# Patient Record
Sex: MALE | Race: WHITE | NOT HISPANIC OR LATINO | Employment: OTHER | URBAN - METROPOLITAN AREA
[De-identification: names, ages, dates, MRNs, and addresses within clinical notes are randomized per-mention and may not be internally consistent; named-entity substitution may affect disease eponyms.]

---

## 2017-04-30 ENCOUNTER — HOSPITAL ENCOUNTER (EMERGENCY)
Facility: HOSPITAL | Age: 57
Discharge: HOME | End: 2017-04-30
Admitting: EMERGENCY MEDICINE
Payer: MEDICARE

## 2017-04-30 ENCOUNTER — APPOINTMENT (EMERGENCY)
Dept: CT IMAGING | Facility: HOSPITAL | Age: 57
End: 2017-04-30
Payer: MEDICARE

## 2017-04-30 VITALS
TEMPERATURE: 98.2 F | WEIGHT: 180 LBS | HEIGHT: 72 IN | BODY MASS INDEX: 24.38 KG/M2 | SYSTOLIC BLOOD PRESSURE: 134 MMHG | HEART RATE: 78 BPM | OXYGEN SATURATION: 95 % | DIASTOLIC BLOOD PRESSURE: 67 MMHG | RESPIRATION RATE: 16 BRPM

## 2017-04-30 DIAGNOSIS — K52.9 COLITIS, ACUTE: Primary | ICD-10-CM

## 2017-04-30 DIAGNOSIS — R10.13 EPIGASTRIC ABDOMINAL PAIN: ICD-10-CM

## 2017-04-30 LAB
ALBUMIN SERPL BCP-MCNC: 3.5 G/DL (ref 3.5–5)
ALP SERPL-CCNC: 82 U/L (ref 46–116)
ALT SERPL W P-5'-P-CCNC: 21 U/L (ref 12–78)
ANION GAP SERPL CALCULATED.3IONS-SCNC: 6 MMOL/L (ref 4–13)
AST SERPL W P-5'-P-CCNC: 15 U/L (ref 5–45)
BASOPHILS # BLD AUTO: 0.02 THOUSANDS/ΜL (ref 0–0.1)
BASOPHILS NFR BLD AUTO: 0 % (ref 0–1)
BILIRUB SERPL-MCNC: 0.6 MG/DL (ref 0.2–1)
BUN SERPL-MCNC: 18 MG/DL (ref 5–25)
CALCIUM SERPL-MCNC: 8.9 MG/DL (ref 8.3–10.1)
CHLORIDE SERPL-SCNC: 106 MMOL/L (ref 100–108)
CLARITY, POC: CLEAR
CO2 SERPL-SCNC: 29 MMOL/L (ref 21–32)
COLOR, POC: YELLOW
CREAT SERPL-MCNC: 0.87 MG/DL (ref 0.6–1.3)
EOSINOPHIL # BLD AUTO: 0.01 THOUSAND/ΜL (ref 0–0.61)
EOSINOPHIL NFR BLD AUTO: 0 % (ref 0–6)
ERYTHROCYTE [DISTWIDTH] IN BLOOD BY AUTOMATED COUNT: 13.5 % (ref 11.6–15.1)
EXT BILIRUBIN, UA: NORMAL
EXT BLOOD URINE: NORMAL
EXT GLUCOSE, UA: 100
EXT KETONES: 40
EXT NITRITE, UA: NORMAL
EXT PH, UA: 7
EXT PROTEIN, UA: NORMAL
EXT SPECIFIC GRAVITY, UA: 1.01
EXT UROBILINOGEN: 0.2
GFR SERPL CREATININE-BSD FRML MDRD: >60 ML/MIN/1.73SQ M
GLUCOSE SERPL-MCNC: 104 MG/DL (ref 65–140)
HCT VFR BLD AUTO: 42.5 % (ref 36.5–49.3)
HGB BLD-MCNC: 15.5 G/DL (ref 12–17)
LIPASE SERPL-CCNC: 65 U/L (ref 73–393)
LYMPHOCYTES # BLD AUTO: 1.07 THOUSANDS/ΜL (ref 0.6–4.47)
LYMPHOCYTES NFR BLD AUTO: 9 % (ref 14–44)
MCH RBC QN AUTO: 30.7 PG (ref 26.8–34.3)
MCHC RBC AUTO-ENTMCNC: 36.5 G/DL (ref 31.4–37.4)
MCV RBC AUTO: 84 FL (ref 82–98)
MONOCYTES # BLD AUTO: 0.96 THOUSAND/ΜL (ref 0.17–1.22)
MONOCYTES NFR BLD AUTO: 8 % (ref 4–12)
NEUTROPHILS # BLD AUTO: 10.07 THOUSANDS/ΜL (ref 1.85–7.62)
NEUTS SEG NFR BLD AUTO: 83 % (ref 43–75)
PLATELET # BLD AUTO: 201 THOUSANDS/UL (ref 149–390)
PMV BLD AUTO: 8.8 FL (ref 8.9–12.7)
POTASSIUM SERPL-SCNC: 3.7 MMOL/L (ref 3.5–5.3)
PROT SERPL-MCNC: 6.9 G/DL (ref 6.4–8.2)
RBC # BLD AUTO: 5.05 MILLION/UL (ref 3.88–5.62)
SODIUM SERPL-SCNC: 141 MMOL/L (ref 136–145)
WBC # BLD AUTO: 12.13 THOUSAND/UL (ref 4.31–10.16)
WBC # BLD EST: NORMAL 10*3/UL

## 2017-04-30 PROCEDURE — 99284 EMERGENCY DEPT VISIT MOD MDM: CPT

## 2017-04-30 PROCEDURE — 96374 THER/PROPH/DIAG INJ IV PUSH: CPT

## 2017-04-30 PROCEDURE — 80053 COMPREHEN METABOLIC PANEL: CPT | Performed by: PHYSICIAN ASSISTANT

## 2017-04-30 PROCEDURE — 36415 COLL VENOUS BLD VENIPUNCTURE: CPT | Performed by: PHYSICIAN ASSISTANT

## 2017-04-30 PROCEDURE — 96361 HYDRATE IV INFUSION ADD-ON: CPT

## 2017-04-30 PROCEDURE — 96375 TX/PRO/DX INJ NEW DRUG ADDON: CPT

## 2017-04-30 PROCEDURE — 83690 ASSAY OF LIPASE: CPT | Performed by: PHYSICIAN ASSISTANT

## 2017-04-30 PROCEDURE — 81002 URINALYSIS NONAUTO W/O SCOPE: CPT | Performed by: PHYSICIAN ASSISTANT

## 2017-04-30 PROCEDURE — 74177 CT ABD & PELVIS W/CONTRAST: CPT

## 2017-04-30 PROCEDURE — 93005 ELECTROCARDIOGRAM TRACING: CPT | Performed by: PHYSICIAN ASSISTANT

## 2017-04-30 PROCEDURE — 85025 COMPLETE CBC W/AUTO DIFF WBC: CPT | Performed by: PHYSICIAN ASSISTANT

## 2017-04-30 RX ORDER — DOCUSATE SODIUM 100 MG/1
100 CAPSULE, LIQUID FILLED ORAL 2 TIMES DAILY
COMMUNITY
End: 2021-08-19

## 2017-04-30 RX ORDER — METRONIDAZOLE 500 MG/1
500 TABLET ORAL 3 TIMES DAILY
Qty: 30 TABLET | Refills: 0 | Status: SHIPPED | OUTPATIENT
Start: 2017-04-30 | End: 2017-05-10

## 2017-04-30 RX ORDER — METOCLOPRAMIDE 10 MG/1
10 TABLET ORAL 3 TIMES DAILY PRN
COMMUNITY
End: 2019-10-25

## 2017-04-30 RX ORDER — METRONIDAZOLE 250 MG/1
500 TABLET ORAL ONCE
Status: COMPLETED | OUTPATIENT
Start: 2017-04-30 | End: 2017-04-30

## 2017-04-30 RX ORDER — ONDANSETRON 2 MG/ML
4 INJECTION INTRAMUSCULAR; INTRAVENOUS ONCE
Status: COMPLETED | OUTPATIENT
Start: 2017-04-30 | End: 2017-04-30

## 2017-04-30 RX ORDER — CIPROFLOXACIN 250 MG/1
500 TABLET, FILM COATED ORAL ONCE
Status: COMPLETED | OUTPATIENT
Start: 2017-04-30 | End: 2017-04-30

## 2017-04-30 RX ORDER — KETOROLAC TROMETHAMINE 30 MG/ML
30 INJECTION, SOLUTION INTRAMUSCULAR; INTRAVENOUS ONCE
Status: COMPLETED | OUTPATIENT
Start: 2017-04-30 | End: 2017-04-30

## 2017-04-30 RX ORDER — FOLIC ACID/MULTIVIT,IRON,MINER .4-18-35
1 TABLET,CHEWABLE ORAL DAILY
COMMUNITY

## 2017-04-30 RX ORDER — CIPROFLOXACIN 500 MG/1
500 TABLET, FILM COATED ORAL 2 TIMES DAILY
Qty: 20 TABLET | Refills: 0 | Status: SHIPPED | OUTPATIENT
Start: 2017-04-30 | End: 2017-05-10

## 2017-04-30 RX ORDER — ONDANSETRON 4 MG/1
4 TABLET, ORALLY DISINTEGRATING ORAL EVERY 8 HOURS PRN
Qty: 15 TABLET | Refills: 0 | Status: SHIPPED | OUTPATIENT
Start: 2017-04-30 | End: 2018-01-21 | Stop reason: ALTCHOICE

## 2017-04-30 RX ADMIN — ONDANSETRON 4 MG: 2 INJECTION INTRAMUSCULAR; INTRAVENOUS at 12:23

## 2017-04-30 RX ADMIN — METRONIDAZOLE 500 MG: 250 TABLET ORAL at 14:13

## 2017-04-30 RX ADMIN — KETOROLAC TROMETHAMINE 30 MG: 30 INJECTION, SOLUTION INTRAMUSCULAR at 12:20

## 2017-04-30 RX ADMIN — CIPROFLOXACIN HYDROCHLORIDE 500 MG: 250 TABLET, FILM COATED ORAL at 14:13

## 2017-04-30 RX ADMIN — IOHEXOL 100 ML: 350 INJECTION, SOLUTION INTRAVENOUS at 13:25

## 2017-04-30 RX ADMIN — SODIUM CHLORIDE 1000 ML: 0.9 INJECTION, SOLUTION INTRAVENOUS at 12:20

## 2017-05-01 LAB
ATRIAL RATE: 74 BPM
P AXIS: 66 DEGREES
PR INTERVAL: 132 MS
QRS AXIS: 82 DEGREES
QRSD INTERVAL: 92 MS
QT INTERVAL: 372 MS
QTC INTERVAL: 412 MS
T WAVE AXIS: 59 DEGREES
VENTRICULAR RATE: 74 BPM

## 2017-05-03 ENCOUNTER — ANESTHESIA EVENT (OUTPATIENT)
Dept: PERIOP | Facility: HOSPITAL | Age: 57
End: 2017-05-03
Payer: MEDICARE

## 2017-05-03 ENCOUNTER — HOSPITAL ENCOUNTER (OUTPATIENT)
Facility: HOSPITAL | Age: 57
Setting detail: OUTPATIENT SURGERY
Discharge: HOME | End: 2017-05-03
Attending: INTERNAL MEDICINE | Admitting: INTERNAL MEDICINE
Payer: MEDICARE

## 2017-05-03 ENCOUNTER — ANESTHESIA (OUTPATIENT)
Dept: PERIOP | Facility: HOSPITAL | Age: 57
End: 2017-05-03
Payer: MEDICARE

## 2017-05-03 VITALS
TEMPERATURE: 98.7 F | BODY MASS INDEX: 25.18 KG/M2 | WEIGHT: 170 LBS | OXYGEN SATURATION: 99 % | DIASTOLIC BLOOD PRESSURE: 63 MMHG | SYSTOLIC BLOOD PRESSURE: 105 MMHG | HEART RATE: 82 BPM | RESPIRATION RATE: 16 BRPM | HEIGHT: 69 IN

## 2017-05-03 PROCEDURE — 87077 CULTURE AEROBIC IDENTIFY: CPT | Performed by: INTERNAL MEDICINE

## 2017-05-03 RX ORDER — SODIUM CHLORIDE 9 MG/ML
20 INJECTION, SOLUTION INTRAVENOUS CONTINUOUS
Status: DISCONTINUED | OUTPATIENT
Start: 2017-05-03 | End: 2017-05-03 | Stop reason: HOSPADM

## 2017-05-03 RX ORDER — PROPOFOL 10 MG/ML
INJECTION, EMULSION INTRAVENOUS AS NEEDED
Status: DISCONTINUED | OUTPATIENT
Start: 2017-05-03 | End: 2017-05-03 | Stop reason: SURG

## 2017-05-03 RX ORDER — ONDANSETRON 2 MG/ML
4 INJECTION INTRAMUSCULAR; INTRAVENOUS ONCE
Status: DISCONTINUED | OUTPATIENT
Start: 2017-05-03 | End: 2017-05-03 | Stop reason: HOSPADM

## 2017-05-03 RX ORDER — PANTOPRAZOLE SODIUM 40 MG/1
40 GRANULE, DELAYED RELEASE ORAL
COMMUNITY
End: 2017-07-19 | Stop reason: HOSPADM

## 2017-05-03 RX ORDER — TAMSULOSIN HYDROCHLORIDE 0.4 MG/1
0.4 CAPSULE ORAL
COMMUNITY

## 2017-05-03 RX ORDER — SODIUM CHLORIDE 9 MG/ML
INJECTION, SOLUTION INTRAVENOUS CONTINUOUS PRN
Status: DISCONTINUED | OUTPATIENT
Start: 2017-05-03 | End: 2017-05-03 | Stop reason: SURG

## 2017-05-03 RX ADMIN — PROPOFOL 50 MG: 10 INJECTION, EMULSION INTRAVENOUS at 07:37

## 2017-05-03 RX ADMIN — SODIUM CHLORIDE: 0.9 INJECTION, SOLUTION INTRAVENOUS at 07:09

## 2017-05-03 RX ADMIN — PROPOFOL 100 MG: 10 INJECTION, EMULSION INTRAVENOUS at 07:36

## 2017-05-04 LAB — UREASE TISS QL: NEGATIVE

## 2017-07-19 ENCOUNTER — ANESTHESIA (OUTPATIENT)
Dept: PERIOP | Facility: HOSPITAL | Age: 57
End: 2017-07-19
Payer: MEDICARE

## 2017-07-19 ENCOUNTER — ANESTHESIA EVENT (OUTPATIENT)
Dept: PERIOP | Facility: HOSPITAL | Age: 57
End: 2017-07-19
Payer: MEDICARE

## 2017-07-19 ENCOUNTER — HOSPITAL ENCOUNTER (OUTPATIENT)
Facility: HOSPITAL | Age: 57
Setting detail: OUTPATIENT SURGERY
Discharge: HOME | End: 2017-07-19
Attending: INTERNAL MEDICINE | Admitting: INTERNAL MEDICINE
Payer: MEDICARE

## 2017-07-19 VITALS
HEART RATE: 69 BPM | TEMPERATURE: 97.9 F | BODY MASS INDEX: 25.77 KG/M2 | OXYGEN SATURATION: 100 % | HEIGHT: 70 IN | RESPIRATION RATE: 16 BRPM | WEIGHT: 180 LBS | DIASTOLIC BLOOD PRESSURE: 77 MMHG | SYSTOLIC BLOOD PRESSURE: 126 MMHG

## 2017-07-19 RX ORDER — FAMOTIDINE 40 MG/1
40 TABLET, FILM COATED ORAL DAILY
COMMUNITY
End: 2017-07-19 | Stop reason: HOSPADM

## 2017-07-19 RX ORDER — SODIUM CHLORIDE 9 MG/ML
20 INJECTION, SOLUTION INTRAVENOUS CONTINUOUS
Status: DISCONTINUED | OUTPATIENT
Start: 2017-07-19 | End: 2017-07-19 | Stop reason: HOSPADM

## 2017-07-19 RX ORDER — PANTOPRAZOLE SODIUM 40 MG/1
40 TABLET, DELAYED RELEASE ORAL DAILY
Qty: 30 TABLET | Refills: 3 | Status: SHIPPED | OUTPATIENT
Start: 2017-07-19 | End: 2018-01-21 | Stop reason: ALTCHOICE

## 2017-07-19 RX ORDER — MIDAZOLAM HYDROCHLORIDE 1 MG/ML
INJECTION INTRAMUSCULAR; INTRAVENOUS AS NEEDED
Status: DISCONTINUED | OUTPATIENT
Start: 2017-07-19 | End: 2017-07-19 | Stop reason: SURG

## 2017-07-19 RX ORDER — PROPOFOL 10 MG/ML
INJECTION, EMULSION INTRAVENOUS AS NEEDED
Status: DISCONTINUED | OUTPATIENT
Start: 2017-07-19 | End: 2017-07-19 | Stop reason: SURG

## 2017-07-19 RX ADMIN — PROPOFOL 200 MG: 10 INJECTION, EMULSION INTRAVENOUS at 07:45

## 2017-07-19 RX ADMIN — MIDAZOLAM HYDROCHLORIDE 2 MG: 1 INJECTION, SOLUTION INTRAMUSCULAR; INTRAVENOUS at 07:30

## 2017-07-19 RX ADMIN — SODIUM CHLORIDE: 0.9 INJECTION, SOLUTION INTRAVENOUS at 07:20

## 2018-01-21 ENCOUNTER — HOSPITAL ENCOUNTER (EMERGENCY)
Facility: HOSPITAL | Age: 58
Discharge: HOME | End: 2018-01-21
Attending: EMERGENCY MEDICINE
Payer: MEDICARE

## 2018-01-21 VITALS
WEIGHT: 180 LBS | HEART RATE: 78 BPM | BODY MASS INDEX: 25.83 KG/M2 | SYSTOLIC BLOOD PRESSURE: 122 MMHG | TEMPERATURE: 98.2 F | RESPIRATION RATE: 20 BRPM | DIASTOLIC BLOOD PRESSURE: 77 MMHG | OXYGEN SATURATION: 100 %

## 2018-01-21 DIAGNOSIS — K21.9 GERD (GASTROESOPHAGEAL REFLUX DISEASE): Primary | ICD-10-CM

## 2018-01-21 LAB
ALBUMIN SERPL BCP-MCNC: 3.6 G/DL (ref 3.5–5)
ALP SERPL-CCNC: 81 U/L (ref 46–116)
ALT SERPL W P-5'-P-CCNC: 23 U/L (ref 12–78)
ANION GAP SERPL CALCULATED.3IONS-SCNC: 9 MMOL/L (ref 4–13)
AST SERPL W P-5'-P-CCNC: 19 U/L (ref 5–45)
BASOPHILS # BLD AUTO: 0.03 THOUSANDS/ΜL (ref 0–0.1)
BASOPHILS NFR BLD AUTO: 1 % (ref 0–1)
BILIRUB SERPL-MCNC: 1.4 MG/DL (ref 0.2–1)
BUN SERPL-MCNC: 22 MG/DL (ref 5–25)
CALCIUM SERPL-MCNC: 8.8 MG/DL (ref 8.3–10.1)
CHLORIDE SERPL-SCNC: 108 MMOL/L (ref 100–108)
CO2 SERPL-SCNC: 28 MMOL/L (ref 21–32)
CREAT SERPL-MCNC: 1.02 MG/DL (ref 0.6–1.3)
EOSINOPHIL # BLD AUTO: 0.06 THOUSAND/ΜL (ref 0–0.61)
EOSINOPHIL NFR BLD AUTO: 1 % (ref 0–6)
ERYTHROCYTE [DISTWIDTH] IN BLOOD BY AUTOMATED COUNT: 14.1 % (ref 11.6–15.1)
GFR SERPL CREATININE-BSD FRML MDRD: 82 ML/MIN/1.73SQ M
GLUCOSE SERPL-MCNC: 120 MG/DL (ref 65–140)
HCT VFR BLD AUTO: 42.2 % (ref 36.5–49.3)
HGB BLD-MCNC: 14.9 G/DL (ref 12–17)
LIPASE SERPL-CCNC: 92 U/L (ref 73–393)
LYMPHOCYTES # BLD AUTO: 0.76 THOUSANDS/ΜL (ref 0.6–4.47)
LYMPHOCYTES NFR BLD AUTO: 13 % (ref 14–44)
MCH RBC QN AUTO: 29.9 PG (ref 26.8–34.3)
MCHC RBC AUTO-ENTMCNC: 35.3 G/DL (ref 31.4–37.4)
MCV RBC AUTO: 85 FL (ref 82–98)
MONOCYTES # BLD AUTO: 0.75 THOUSAND/ΜL (ref 0.17–1.22)
MONOCYTES NFR BLD AUTO: 12 % (ref 4–12)
NEUTROPHILS # BLD AUTO: 4.44 THOUSANDS/ΜL (ref 1.85–7.62)
NEUTS SEG NFR BLD AUTO: 73 % (ref 43–75)
PLATELET # BLD AUTO: 234 THOUSANDS/UL (ref 149–390)
PMV BLD AUTO: 8.8 FL (ref 8.9–12.7)
POTASSIUM SERPL-SCNC: 3.9 MMOL/L (ref 3.5–5.3)
PROT SERPL-MCNC: 7.5 G/DL (ref 6.4–8.2)
RBC # BLD AUTO: 4.98 MILLION/UL (ref 3.88–5.62)
SODIUM SERPL-SCNC: 145 MMOL/L (ref 136–145)
WBC # BLD AUTO: 6.04 THOUSAND/UL (ref 4.31–10.16)

## 2018-01-21 PROCEDURE — 96374 THER/PROPH/DIAG INJ IV PUSH: CPT

## 2018-01-21 PROCEDURE — C9113 INJ PANTOPRAZOLE SODIUM, VIA: HCPCS | Performed by: EMERGENCY MEDICINE

## 2018-01-21 PROCEDURE — 96375 TX/PRO/DX INJ NEW DRUG ADDON: CPT

## 2018-01-21 PROCEDURE — 85025 COMPLETE CBC W/AUTO DIFF WBC: CPT | Performed by: EMERGENCY MEDICINE

## 2018-01-21 PROCEDURE — 36415 COLL VENOUS BLD VENIPUNCTURE: CPT | Performed by: EMERGENCY MEDICINE

## 2018-01-21 PROCEDURE — 96361 HYDRATE IV INFUSION ADD-ON: CPT

## 2018-01-21 PROCEDURE — 99283 EMERGENCY DEPT VISIT LOW MDM: CPT

## 2018-01-21 PROCEDURE — 80053 COMPREHEN METABOLIC PANEL: CPT | Performed by: EMERGENCY MEDICINE

## 2018-01-21 PROCEDURE — 83690 ASSAY OF LIPASE: CPT | Performed by: EMERGENCY MEDICINE

## 2018-01-21 RX ORDER — PANTOPRAZOLE SODIUM 40 MG/1
40 INJECTION, POWDER, FOR SOLUTION INTRAVENOUS ONCE
Status: COMPLETED | OUTPATIENT
Start: 2018-01-21 | End: 2018-01-21

## 2018-01-21 RX ORDER — PANTOPRAZOLE SODIUM 40 MG/1
40 TABLET, DELAYED RELEASE ORAL DAILY
Qty: 30 TABLET | Refills: 0 | Status: SHIPPED | OUTPATIENT
Start: 2018-01-21 | End: 2019-04-29 | Stop reason: DRUGHIGH

## 2018-01-21 RX ORDER — METOCLOPRAMIDE HYDROCHLORIDE 5 MG/ML
10 INJECTION INTRAMUSCULAR; INTRAVENOUS ONCE
Status: COMPLETED | OUTPATIENT
Start: 2018-01-21 | End: 2018-01-21

## 2018-01-21 RX ADMIN — SODIUM CHLORIDE 500 ML: 0.9 INJECTION, SOLUTION INTRAVENOUS at 13:05

## 2018-01-21 RX ADMIN — METOCLOPRAMIDE 10 MG: 5 INJECTION, SOLUTION INTRAMUSCULAR; INTRAVENOUS at 13:01

## 2018-01-21 RX ADMIN — PANTOPRAZOLE SODIUM 40 MG: 40 INJECTION, POWDER, FOR SOLUTION INTRAVENOUS at 13:01

## 2018-01-21 NOTE — DISCHARGE INSTRUCTIONS
Gastroesophageal Reflux Disease   WHAT YOU NEED TO KNOW:   Gastroesophageal reflux occurs when acid and food in the stomach back up into the esophagus  Gastroesophageal reflux disease (GERD) is reflux that occurs more than twice a week for a few weeks  It usually causes heartburn and other symptoms  GERD can cause other health problems over time if it is not treated  DISCHARGE INSTRUCTIONS:   Seek care immediately if:   · You feel full and cannot burp or vomit  · You have severe chest pain and sudden trouble breathing  · Your bowel movements are black, bloody, or tarry-looking  · Your vomit looks like coffee grounds or has blood in it  Contact your healthcare provider if:   · You vomit large amounts, or you vomit often  · You have trouble breathing after you vomit  · You have trouble swallowing, or pain with swallowing  · You are losing weight without trying  · Your symptoms get worse or do not improve with treatment  · You have questions or concerns about your condition or care  Medicines:   · Medicines  are used to decrease stomach acid  Medicine may also be used to help your lower esophageal sphincter and stomach contract (tighten) more  · Take your medicine as directed  Contact your healthcare provider if you think your medicine is not helping or if you have side effects  Tell him or her if you are allergic to any medicine  Keep a list of the medicines, vitamins, and herbs you take  Include the amounts, and when and why you take them  Bring the list or the pill bottles to follow-up visits  Carry your medicine list with you in case of an emergency  Manage GERD:   · Do not have foods or drinks that may increase heartburn  These include chocolate, peppermint, fried or fatty foods, drinks that contain caffeine, or carbonated drinks (soda)  Other foods include spicy foods, onions, tomatoes, and tomato-based foods   Do not have foods or drinks that can irritate your esophagus, such as citrus fruits, juices, and alcohol  · Do not eat large meals  When you eat a lot of food at one time, your stomach needs more acid to digest it  Eat 6 small meals each day instead of 3 large ones, and eat slowly  Do not eat meals 2 to 3 hours before bedtime  · Elevate the head of your bed  Place 6-inch blocks under the head of your bed frame  You may also use more than one pillow under your head and shoulders while you sleep  · Maintain a healthy weight  If you are overweight, weight loss may help relieve symptoms of GERD  · Do not smoke  Smoking weakens the lower esophageal sphincter and increases the risk of GERD  Ask your healthcare provider for information if you currently smoke and need help to quit  E-cigarettes or smokeless tobacco still contain nicotine  Talk to your healthcare provider before you use these products  · Do not wear clothing that is tight around your waist   Tight clothing can put pressure on your stomach and cause or worsen GERD symptoms  Follow up with your healthcare provider as directed:  Write down your questions so you remember to ask them during your visits  © 2017 [facility] Information is for End User's use only and may not be sold, redistributed or otherwise used for commercial purposes  All illustrations and images included in CareNotes® are the copyrighted property of A D [employer] M [employer] Inc  or [employer]  The above information is an  only  It is not intended as medical advice for individual conditions or treatments  Talk to your doctor, nurse or pharmacist before following any medical regimen to see if it is safe and effective for you

## 2018-01-21 NOTE — ED PROVIDER NOTES
History  Chief Complaint   Patient presents with    Hiccups     Brought to ED for evaluation of chronic hiccups and belching since this morning  Pt has hx of same  Was treated at ED [place] for same 2 days ago  This is a 27-year-old male coming from a group home with burping abdominal pain started 3 days ago  He is mentally challenged and a poor historian  Recurrent issues of GERD as well as hiccups  He was at the [facility] with basic labs that showed elevated bilirubin otherwise normal on he had a prescription for Zofran 0 DT which is not helping  Prior to Admission Medications   Prescriptions Last Dose Informant Patient Reported? Taking?   docusate sodium (COLACE) 100 mg capsule  Self Yes No   Sig: Take 100 mg by mouth 2 (two) times a day   metoclopramide (REGLAN) 10 mg tablet  Self Yes No   Sig: Take 10 mg by mouth 3 (three) times a day as needed   multivitamin-iron-minerals-folic acid (CENTRUM) chewable tablet  Self Yes No   Sig: Chew 1 tablet daily   tamsulosin (FLOMAX) 0 4 mg   Yes No   Sig: Take 0 4 mg by mouth daily with dinner      Facility-Administered Medications: None       Past Medical History:   Diagnosis Date    GERD (gastroesophageal reflux disease)     Intellectual disability     Intractable hiccups     hospitalized for same    Mental retardation     Pneumonia        Past Surgical History:   Procedure Laterality Date    CHOLECYSTECTOMY      ESOPHAGOGASTRODUODENOSCOPY N/A 5/3/2017    Procedure: ESOPHAGOGASTRODUODENOSCOPY (EGD); Surgeon: Berle Lanes, MD;  Location:  MAIN OR;  Service:    Crystal GARCIA ESOPHAGOGASTRODUODENOSCOPY TRANSORAL DIAGNOSTIC N/A 7/19/2017    Procedure: ESOPHAGOGASTRODUODENOSCOPY (EGD); Surgeon: Berle Lanes, MD;  Location:  MAIN OR;  Service: Gastroenterology       History reviewed  No pertinent family history  I have reviewed and agree with the history as documented      Social History   Substance Use Topics    Smoking status: Never Smoker  Smokeless tobacco: Never Used    Alcohol use No        Review of Systems   Unable to perform ROS: Patient nonverbal       Physical Exam  ED Triage Vitals [01/21/18 1221]   Temperature Pulse Respirations Blood Pressure SpO2   98 2 °F (36 8 °C) 78 20 122/77 100 %      Temp Source Heart Rate Source Patient Position - Orthostatic VS BP Location FiO2 (%)   Tympanic Monitor Sitting -- --      Pain Score       No Pain           Orthostatic Vital Signs  Vitals:    01/21/18 1221   BP: 122/77   Pulse: 78   Patient Position - Orthostatic VS: Sitting       Physical Exam   Constitutional: He appears well-developed and well-nourished  HENT:   Mouth/Throat: Oropharynx is clear and moist    Eyes: Conjunctivae and EOM are normal  Pupils are equal, round, and reactive to light  Neck: Normal range of motion  Neck supple  No spinous process tenderness present  Cardiovascular: Normal rate, regular rhythm, normal heart sounds and intact distal pulses  Pulmonary/Chest: Effort normal and breath sounds normal  No respiratory distress  He has no wheezes  Abdominal: Soft  Bowel sounds are normal  He exhibits no distension  There is tenderness  There is no rebound and no guarding  Generalized tenderness, frequent burping  Musculoskeletal: Normal range of motion  Neurological: He is alert  He has normal strength  No sensory deficit  GCS eye subscore is 4  GCS verbal subscore is 5  GCS motor subscore is 6  Skin: Skin is warm and dry  No rash noted  Psychiatric: His mood appears anxious  Nursing note and vitals reviewed        ED Medications  Medications   metoclopramide (REGLAN) injection 10 mg (10 mg Intravenous Given 1/21/18 1301)   sodium chloride 0 9 % bolus 500 mL (0 mL Intravenous Stopped 1/21/18 1357)   pantoprazole (PROTONIX) injection 40 mg (40 mg Intravenous Given 1/21/18 1301)       Diagnostic Studies  Results Reviewed     Procedure Component Value Units Date/Time    CBC and differential [[id number]] (Abnormal) Collected:  01/21/18 1300    Lab Status:  Final result Specimen:  Blood from Arm, Right Updated:  01/21/18 1403     WBC 6 04 Thousand/uL      RBC 4 98 Million/uL      Hemoglobin 14 9 g/dL      Hematocrit 42 2 %      MCV 85 fL      MCH 29 9 pg      MCHC 35 3 g/dL      RDW 14 1 %      MPV 8 8 (L) fL      Platelets 968 Thousands/uL      Neutrophils Relative 73 %      Lymphocytes Relative 13 (L) %      Monocytes Relative 12 %      Eosinophils Relative 1 %      Basophils Relative 1 %      Neutrophils Absolute 4 44 Thousands/µL      Lymphocytes Absolute 0 76 Thousands/µL      Monocytes Absolute 0 75 Thousand/µL      Eosinophils Absolute 0 06 Thousand/µL      Basophils Absolute 0 03 Thousands/µL     CMP [[id number]]  (Abnormal) Collected:  01/21/18 1300    Lab Status:  Final result Specimen:  Blood from Arm, Right Updated:  01/21/18 1341     Sodium 145 mmol/L      Potassium 3 9 mmol/L      Chloride 108 mmol/L      CO2 28 mmol/L      Anion Gap 9 mmol/L      BUN 22 mg/dL      Creatinine 1 02 mg/dL      Glucose 120 mg/dL      Calcium 8 8 mg/dL      AST 19 U/L      ALT 23 U/L      Alkaline Phosphatase 81 U/L      Total Protein 7 5 g/dL      Albumin 3 6 g/dL      Total Bilirubin 1 40 (H) mg/dL      eGFR 82 ml/min/1 73sq m     Narrative:         National Kidney Disease Education Program recommendations are as follows:  GFR calculation is accurate only with a steady state creatinine  Chronic Kidney disease less than 60 ml/min/1 73 sq  meters  Kidney failure less than 15 ml/min/1 73 sq  meters      Lipase [[id number]]  (Normal) Collected:  01/21/18 1300    Lab Status:  Final result Specimen:  Blood from Arm, Right Updated:  01/21/18 1341     Lipase 92 u/L                  No orders to display              Procedures  Procedures       Phone Contacts  ED Phone Contact    ED Course  ED Course                                MDM  Number of Diagnoses or Management Options  GERD (gastroesophageal reflux disease): new and requires workup     Amount and/or Complexity of Data Reviewed  Clinical lab tests: ordered and reviewed  Obtain history from someone other than the patient: yes    Patient Progress  Patient progress: improved    CritCare Time    Disposition  Final diagnoses:   GERD (gastroesophageal reflux disease)     Time reflects when diagnosis was documented in both MDM as applicable and the Disposition within this note     Time User Action Codes Description Comment    1/21/2018  2:24 PM Davidzach Galaviz Add [K21 9] GERD (gastroesophageal reflux disease)       ED Disposition     ED Disposition Condition Comment    Discharge  Camacho Munguia discharge to home/self care  Condition at discharge: Good        Follow-up Information     Follow up With Specialties Details Why Contact Info    your gastroenterologist  In 2 days          Discharge Medication List as of 1/21/2018  2:25 PM      START taking these medications    Details   pantoprazole (PROTONIX) 40 mg tablet Take 1 tablet by mouth daily, Starting Sun 1/21/2018, Print         CONTINUE these medications which have NOT CHANGED    Details   docusate sodium (COLACE) 100 mg capsule Take 100 mg by mouth 2 (two) times a day, Until Discontinued, Historical Med      metoclopramide (REGLAN) 10 mg tablet Take 10 mg by mouth 3 (three) times a day as needed, Until Discontinued, Historical Med      multivitamin-iron-minerals-folic acid (CENTRUM) chewable tablet Chew 1 tablet daily, Until Discontinued, Historical Med      tamsulosin (FLOMAX) 0 4 mg Take 0 4 mg by mouth daily with dinner, Until Discontinued, Historical Med           No discharge procedures on file      ED Provider  Electronically Signed by           Donita Cervantes DO  01/21/18 2937

## 2018-03-30 RX ORDER — SODIUM CHLORIDE 9 MG/ML
20 INJECTION, SOLUTION INTRAVENOUS CONTINUOUS
Status: DISCONTINUED | OUTPATIENT
Start: 2018-03-30 | End: 2018-03-30

## 2018-04-03 RX ORDER — RANITIDINE 300 MG/1
300 CAPSULE ORAL EVERY EVENING
COMMUNITY
End: 2019-10-25

## 2018-04-04 ENCOUNTER — HOSPITAL ENCOUNTER (OUTPATIENT)
Facility: HOSPITAL | Age: 58
Setting detail: OUTPATIENT SURGERY
Discharge: HOME HEALTH | End: 2018-04-04
Attending: INTERNAL MEDICINE | Admitting: INTERNAL MEDICINE
Payer: MEDICARE

## 2018-04-04 ENCOUNTER — ANESTHESIA (OUTPATIENT)
Dept: PERIOP | Facility: HOSPITAL | Age: 58
End: 2018-04-04
Payer: MEDICARE

## 2018-04-04 ENCOUNTER — ANESTHESIA EVENT (OUTPATIENT)
Dept: PERIOP | Facility: HOSPITAL | Age: 58
End: 2018-04-04
Payer: MEDICARE

## 2018-04-04 VITALS
OXYGEN SATURATION: 100 % | DIASTOLIC BLOOD PRESSURE: 79 MMHG | HEART RATE: 73 BPM | BODY MASS INDEX: 24.44 KG/M2 | SYSTOLIC BLOOD PRESSURE: 134 MMHG | RESPIRATION RATE: 20 BRPM | WEIGHT: 165 LBS | HEIGHT: 69 IN | TEMPERATURE: 98.2 F

## 2018-04-04 DIAGNOSIS — Z86.010 HISTORY OF COLONIC POLYPS: ICD-10-CM

## 2018-04-04 DIAGNOSIS — K20.90 ESOPHAGITIS: ICD-10-CM

## 2018-04-04 PROCEDURE — 88305 TISSUE EXAM BY PATHOLOGIST: CPT | Performed by: PATHOLOGY

## 2018-04-04 RX ORDER — PROPOFOL 10 MG/ML
INJECTION, EMULSION INTRAVENOUS AS NEEDED
Status: DISCONTINUED | OUTPATIENT
Start: 2018-04-04 | End: 2018-04-04 | Stop reason: SURG

## 2018-04-04 RX ORDER — SODIUM CHLORIDE 9 MG/ML
INJECTION, SOLUTION INTRAVENOUS CONTINUOUS PRN
Status: DISCONTINUED | OUTPATIENT
Start: 2018-04-04 | End: 2018-04-04 | Stop reason: SURG

## 2018-04-04 RX ORDER — SODIUM CHLORIDE 9 MG/ML
20 INJECTION, SOLUTION INTRAVENOUS CONTINUOUS
Status: DISCONTINUED | OUTPATIENT
Start: 2018-04-04 | End: 2018-04-04 | Stop reason: HOSPADM

## 2018-04-04 RX ADMIN — PROPOFOL 40 MG: 10 INJECTION, EMULSION INTRAVENOUS at 07:40

## 2018-04-04 RX ADMIN — PROPOFOL 50 MG: 10 INJECTION, EMULSION INTRAVENOUS at 07:30

## 2018-04-04 RX ADMIN — SODIUM CHLORIDE 20 ML/HR: 0.9 INJECTION, SOLUTION INTRAVENOUS at 07:21

## 2018-04-04 RX ADMIN — PROPOFOL 50 MG: 10 INJECTION, EMULSION INTRAVENOUS at 07:25

## 2018-04-04 RX ADMIN — PROPOFOL 100 MG: 10 INJECTION, EMULSION INTRAVENOUS at 07:24

## 2018-04-04 RX ADMIN — PROPOFOL 30 MG: 10 INJECTION, EMULSION INTRAVENOUS at 07:35

## 2018-04-04 RX ADMIN — PROPOFOL 40 MG: 10 INJECTION, EMULSION INTRAVENOUS at 07:50

## 2018-04-04 RX ADMIN — SODIUM CHLORIDE: 0.9 INJECTION, SOLUTION INTRAVENOUS at 07:18

## 2018-04-04 RX ADMIN — PROPOFOL 50 MG: 10 INJECTION, EMULSION INTRAVENOUS at 07:45

## 2018-04-04 NOTE — OP NOTE
**** GI/ENDOSCOPY REPORT ****     PATIENT NAME: Dominguez Oneal ------ VISIT ID:  Patient ID:   [id number] YOB: 1960     INTRODUCTION: Colonoscopy - A 65 male patient presents for an outpatient   Colonoscopy at [address] [place] [address]  PREVIOUS COLONOSCOPY: Patient's last colonoscopy was 6 years ago  INDICATIONS: Screening for personal history of polyps  CONSENT:  The benefits, risks, and alternatives to the procedure were   discussed and informed consent was obtained from the patient  PREPARATION: EKG, pulse, pulse oximetry and blood pressure were monitored   throughout the procedure  The patient was identified by myself both   verbally and by visual inspection of ID band  Airway Assessment   Classification: Airway class 2 - Visualization of the soft palate, fauces   and uvula  ASA Classification: See anesthesia record  MEDICATIONS: Anesthesia-check records     PROCEDURE:  The endoscope was passed without difficulty through the anus   under direct visualization and advanced to the cecum, confirmed by   appendiceal orifice and ileocecal valve  The scope was withdrawn and the   mucosa was carefully examined  The quality of the preparation was good  Retroflexion was performed in the rectum  Cecal Intubation Time: 5   minutes(s) Scope Withdrawal Time: 13 minutes(s)     RECTAL EXAM: Normal rectal exam  Grade II hemorrhoids were found  FINDINGS:  There was evidence of mild diverticulosis in the sigmoid colon  A single sessile polyp, measuring 1 cm in size, was found in the cecum  The polyp was completely removed by snare cautery polypectomy  Retrieved  A single sessile polyp, measuring 1 cm in size, was found in the sigmoid   colon  The polyp was completely removed by snare cautery polypectomy  Retrieved  Grade II hemorrhoids were found  COMPLICATIONS: There were no complications       IMPRESSIONS: Grade II hemorrhoids, unspecified were found during the rectal exam  Mild diverticulosis found in the sigmoid colon  A single   sessile polyp found in the cecum; removed by snare cautery polypectomy  A   single sessile polyp found in the sigmoid colon; removed by snare cautery   polypectomy  Grade II hemorrhoids  RECOMMENDATIONS: Colonoscopy recommended in 3 years  Discharge home when   standard parameters are met  Resume regular diet as tolerated  Continue   current medications  Follow-up on the results of the biopsy specimens  Follow-up appointment with endoscopist in 3 months  ESTIMATED BLOOD LOSS: None  PATHOLOGY SPECIMENS: Completely removed by snare cautery polypectomy  Completely removed by snare cautery polypectomy  Yes     PROCEDURE CODES: : Colonoscopy with snare polypectomy     ICD-9 Codes: V12 72 Personal history of colonic polyps 455 6 Unspecified   hemorrhoids without mention of complication 379 28 Diverticulosis of colon   (without mention of hemorrhage) 211 3 Benign neoplasm of colon 211 3   Benign neoplasm of colon     ICD-10 Codes: Z86 010 Personal history of colonic polyps K64 1 Second   degree hemorrhoids K57 Diverticular disease of intestine K63 5 Polyp of   colon K63 5 Polyp of colon K64 1 Second degree hemorrhoids     PERFORMED BY: ODALYS Christian  on 04/04/2018  Version 1, electronically signed by ODALYS Bowens  on 04/04/2018 at   08:07

## 2018-04-04 NOTE — ANESTHESIA POSTPROCEDURE EVALUATION
Post-Op Assessment Note      CV Status:  Stable    Mental Status:  Alert and awake    Hydration Status:  Euvolemic    PONV Controlled:  None    Airway Patency:  Patent    Post Op Vitals Reviewed: Yes          Staff: Anesthesiologist           BP      Temp      Pulse     Resp      SpO2

## 2018-04-04 NOTE — ANESTHESIA PREPROCEDURE EVALUATION
Review of Systems/Medical History  Patient summary reviewed    No history of anesthetic complications     Cardiovascular  Negative cardio ROS    Pulmonary  Pneumonia,        GI/Hepatic    GERD well controlled,             Endo/Other  Negative endo/other ROS      GYN       Hematology   Musculoskeletal       Neurology      Comment: Mental retardation Psychology   Psychiatric history,              Physical Exam    Airway    Mallampati score: I  TM Distance: >3 FB  Neck ROM: full     Dental   No notable dental hx     Cardiovascular  Comment: Negative ROS, Cardiovascular exam normal    Pulmonary  Pulmonary exam normal     Other Findings        Anesthesia Plan  ASA Score- 2     Anesthesia Type- IV sedation with anesthesia with ASA Monitors  Additional Monitors:   Airway Plan:         Plan Factors-    Induction- intravenous  Postoperative Plan-     Informed Consent- Anesthetic plan and risks discussed with patient

## 2019-04-29 ENCOUNTER — OFFICE VISIT (OUTPATIENT)
Dept: GASTROENTEROLOGY | Facility: CLINIC | Age: 59
End: 2019-04-29
Payer: MEDICARE

## 2019-04-29 VITALS
BODY MASS INDEX: 24.91 KG/M2 | HEART RATE: 68 BPM | WEIGHT: 174 LBS | DIASTOLIC BLOOD PRESSURE: 78 MMHG | SYSTOLIC BLOOD PRESSURE: 122 MMHG | HEIGHT: 70 IN

## 2019-04-29 DIAGNOSIS — K21.00 GASTROESOPHAGEAL REFLUX DISEASE WITH ESOPHAGITIS: Primary | ICD-10-CM

## 2019-04-29 DIAGNOSIS — Z86.010 HISTORY OF COLON POLYPS: ICD-10-CM

## 2019-04-29 PROBLEM — Z86.0100 HISTORY OF COLON POLYPS: Status: ACTIVE | Noted: 2019-04-29

## 2019-04-29 PROCEDURE — 99214 OFFICE O/P EST MOD 30 MIN: CPT | Performed by: INTERNAL MEDICINE

## 2019-04-29 RX ORDER — PANTOPRAZOLE SODIUM 20 MG/1
20 TABLET, DELAYED RELEASE ORAL DAILY
Qty: 30 TABLET | Refills: 11 | Status: SHIPPED | OUTPATIENT
Start: 2019-04-29 | End: 2019-10-25 | Stop reason: SDUPTHER

## 2019-10-25 ENCOUNTER — OFFICE VISIT (OUTPATIENT)
Dept: GASTROENTEROLOGY | Facility: CLINIC | Age: 59
End: 2019-10-25
Payer: MEDICARE

## 2019-10-25 VITALS
DIASTOLIC BLOOD PRESSURE: 80 MMHG | WEIGHT: 172 LBS | SYSTOLIC BLOOD PRESSURE: 130 MMHG | HEIGHT: 70 IN | BODY MASS INDEX: 24.62 KG/M2

## 2019-10-25 DIAGNOSIS — Z86.010 HISTORY OF COLON POLYPS: ICD-10-CM

## 2019-10-25 DIAGNOSIS — K21.00 GASTROESOPHAGEAL REFLUX DISEASE WITH ESOPHAGITIS: Primary | ICD-10-CM

## 2019-10-25 PROCEDURE — 99214 OFFICE O/P EST MOD 30 MIN: CPT | Performed by: INTERNAL MEDICINE

## 2019-10-25 RX ORDER — PANTOPRAZOLE SODIUM 20 MG/1
20 TABLET, DELAYED RELEASE ORAL DAILY
Qty: 30 TABLET | Refills: 11 | Status: SHIPPED | OUTPATIENT
Start: 2019-10-25 | End: 2020-01-31 | Stop reason: SDUPTHER

## 2019-10-25 NOTE — PROGRESS NOTES
[id number] - Outpatient Follow-up Note  Anirudh Ding 63 y o  male MRN: [id number]  Encounter: [id number]    ASSESSMENT AND PLAN:      1  Gastroesophageal reflux disease with esophagitis  50-year-old male here today for follow-up  Doing well on just a 20 mg of pantoprazole  No longer taking the Zantac at night or the Reglan  Eating well  Weight is stable  Continue pantoprazole at 20 for now  - GERD lifestyle modifications discussed with patient and the caretaker today  - pantoprazole (PROTONIX) 20 mg tablet; Take 1 tablet (20 mg total) by mouth daily  Dispense: 30 tablet; Refill: 11    2  History of colon polyps  Last colonoscopy April 2018 with two 1 cm polyps  Three year recall  Next colonoscopy due April 2021  Followup Appointment:  1 year  ______________________________________________________________________    Chief Complaint   Patient presents with    Follow-up     HPI:  50-year-old male presenting today for follow-up  No longer having any reflux or he cups  He is here today with his staff member  He is doing well  Only on 20 mg of pantoprazole and Reglan and Zantac have been completely discontinued  Weight is stable  Appetite is good  Bowels are normal   No GI bleeding, nausea vomiting, abdominal pains  ROS: All other systems negative  Historical Information   Past Medical History:   Diagnosis Date    Colon polyp     GERD (gastroesophageal reflux disease)     Intellectual disability     Intractable hiccups     hospitalized for same    Mental retardation     Pneumonia      Past Surgical History:   Procedure Laterality Date    CHOLECYSTECTOMY      EGD AND COLONOSCOPY  04/04/2018    ESOPHAGOGASTRODUODENOSCOPY N/A 5/3/2017    Procedure: ESOPHAGOGASTRODUODENOSCOPY (EGD);   Surgeon: Umu Joyce MD;  Location:  MAIN OR;  Service:     [place] ESOPHAGOGASTRODUODENOSCOPY TRANSORAL DIAGNOSTIC N/A 7/19/2017    Procedure: ESOPHAGOGASTRODUODENOSCOPY (EGD); Surgeon: Toy Jordan MD;  Location: [facility];  Service: Gastroenterology    [place] ESOPHAGOGASTRODUODENOSCOPY TRANSORAL DIAGNOSTIC N/A 4/4/2018    Procedure: EGD AND COLONOSCOPY;  Surgeon: Toy Jordan MD;  Location:  MAIN OR;  Service: Gastroenterology     Social History     Substance and Sexual Activity   Alcohol Use No     Social History     Substance and Sexual Activity   Drug Use No     Social History     Tobacco Use   Smoking Status Never Smoker   Smokeless Tobacco Never Used     Family History   Problem Relation Age of Onset    Cancer Father     Colon cancer Neg Hx     Colon polyps Neg Hx          Current Outpatient Medications:     docusate sodium (COLACE) 100 mg capsule    multivitamin-iron-minerals-folic acid (CENTRUM) chewable tablet    pantoprazole (PROTONIX) 20 mg tablet    tamsulosin (FLOMAX) 0 4 mg  No Known Allergies  Reviewed medications and allergies and updated as indicated    PHYSICAL EXAM:    Blood pressure 130/80, height 5' 10" (1 778 m), weight 78 kg (172 lb)  Body mass index is 24 68 kg/m²  General Appearance: NAD, cooperative, alert  Eyes: Anicteric, PERRLA, EOMI  ENT:  Normocephalic, atraumatic, normal mucosa  Neck:  Supple, symmetrical, trachea midline  Resp:  Clear to auscultation bilaterally; no rales, rhonchi or wheezing; respirations unlabored   CV:  S1 S2, Regular rate and rhythm; no murmur, rub, or gallop  GI:  Soft, non-tender, non-distended; normal bowel sounds; no masses, no organomegaly   Rectal: Deferred  Musculoskeletal: No cyanosis, clubbing or edema  Normal ROM    Skin:  No jaundice, rashes, or lesions   Heme/Lymph: No palpable cervical lymphadenopathy  Psych: Normal affect, good eye contact  Neuro: No gross deficits, AAOx3    Lab Results:   Lab Results   Component Value Date    WBC 6 04 01/21/2018    HGB 14 9 01/21/2018    HCT 42 2 01/21/2018    MCV 85 01/21/2018     01/21/2018     Lab Results   Component Value Date K 3 9 01/21/2018     01/21/2018    CO2 28 01/21/2018    BUN 22 01/21/2018    CREATININE 1 02 01/21/2018    CALCIUM 8 8 01/21/2018    AST 19 01/21/2018    ALT 23 01/21/2018    ALKPHOS 81 01/21/2018    EGFR 82 01/21/2018     No results found for: IRON, TIBC, FERRITIN  Lab Results   Component Value Date    LIPASE 92 01/21/2018       Recent blood work from September 16, 2019 reviewed  Normal lipid panel, CMP, CBC  Normal LFTs and hemoglobin is 16 6  Radiology Results:   No results found

## 2019-10-25 NOTE — LETTER
October 25, 2019     Theo Saenz DO  [place] [address]    Patient: Marcus Terry   YOB: 1960   Date of Visit: 10/25/2019       Dear Dr Gonzalez Cheung: Thank you for referring Marcus Terry to me for evaluation  Below are my notes for this consultation  If you have questions, please do not hesitate to call me  I look forward to following your patient along with you  Sincerely,        Gail Nielsen MD        CC: No Recipients  Gail Nielsen MD  10/25/2019  9:58 AM  Sign at close encounter  [address] [facility] - Outpatient Follow-up Note  Marcus eTrry 63 y o  male MRN: [id number]  Encounter: [id number]    ASSESSMENT AND PLAN:      1  Gastroesophageal reflux disease with esophagitis  60-year-old male here today for follow-up  Doing well on just a 20 mg of pantoprazole  No longer taking the Zantac at night or the Reglan  Eating well  Weight is stable  Continue pantoprazole at 20 for now  - GERD lifestyle modifications discussed with patient and the caretaker today  - pantoprazole (PROTONIX) 20 mg tablet; Take 1 tablet (20 mg total) by mouth daily  Dispense: 30 tablet; Refill: 11    2  History of colon polyps  Last colonoscopy April 2018 with two 1 cm polyps  Three year recall  Next colonoscopy due April 2021  Followup Appointment:  1 year  ______________________________________________________________________    Chief Complaint   Patient presents with    Follow-up     HPI:  60-year-old male presenting today for follow-up  No longer having any reflux or he cups  He is here today with his staff member  He is doing well  Only on 20 mg of pantoprazole and Reglan and Zantac have been completely discontinued  Weight is stable  Appetite is good  Bowels are normal   No GI bleeding, nausea vomiting, abdominal pains  ROS: All other systems negative      Historical Information   Past Medical History:   Diagnosis Date    Colon polyp     GERD (gastroesophageal reflux disease)     Intellectual disability     Intractable hiccups     hospitalized for same    Mental retardation     Pneumonia      Past Surgical History:   Procedure Laterality Date    CHOLECYSTECTOMY      EGD AND COLONOSCOPY  04/04/2018    ESOPHAGOGASTRODUODENOSCOPY N/A 5/3/2017    Procedure: ESOPHAGOGASTRODUODENOSCOPY (EGD); Surgeon: Claudette Quiroz MD;  Location:  MAIN OR;  Service:    [facility] [place] ESOPHAGOGASTRODUODENOSCOPY TRANSORAL DIAGNOSTIC N/A 7/19/2017    Procedure: ESOPHAGOGASTRODUODENOSCOPY (EGD); Surgeon: Claudette Quiroz MD;  [facility];  Service: Gastroenterology    [place] ESOPHAGOGASTRODUODENOSCOPY TRANSORAL DIAGNOSTIC N/A 4/4/2018    Procedure: EGD AND COLONOSCOPY;  Surgeon: Claudette Quiroz MD;  [facility];  Service: Gastroenterology     Social History     Substance and Sexual Activity   Alcohol Use No     Social History     Substance and Sexual Activity   Drug Use No     Social History     Tobacco Use   Smoking Status Never Smoker   Smokeless Tobacco Never Used     Family History   Problem Relation Age of Onset    Cancer Father     Colon cancer Neg Hx     Colon polyps Neg Hx          Current Outpatient Medications:     docusate sodium (COLACE) 100 mg capsule    multivitamin-iron-minerals-folic acid (CENTRUM) chewable tablet    pantoprazole (PROTONIX) 20 mg tablet    tamsulosin (FLOMAX) 0 4 mg  No Known Allergies  Reviewed medications and allergies and updated as indicated    PHYSICAL EXAM:    Blood pressure 130/80, height 5' 10" (1 778 m), weight 78 kg (172 lb)  Body mass index is 24 68 kg/m²  General Appearance: NAD, cooperative, alert  Eyes: Anicteric, PERRLA, EOMI  ENT:  Normocephalic, atraumatic, normal mucosa  Neck:  Supple, symmetrical, trachea midline  Resp:  Clear to auscultation bilaterally; no rales, rhonchi or wheezing; respirations unlabored   CV:  S1 S2, Regular rate and rhythm; no murmur, rub, or gallop    GI:  Soft, non-tender, non-distended; normal bowel sounds; no masses, no organomegaly   Rectal: Deferred  Musculoskeletal: No cyanosis, clubbing or edema  Normal ROM  Skin:  No jaundice, rashes, or lesions   Heme/Lymph: No palpable cervical lymphadenopathy  Psych: Normal affect, good eye contact  Neuro: No gross deficits, AAOx3    Lab Results:   Lab Results   Component Value Date    WBC 6 04 01/21/2018    HGB 14 9 01/21/2018    HCT 42 2 01/21/2018    MCV 85 01/21/2018     01/21/2018     Lab Results   Component Value Date    K 3 9 01/21/2018     01/21/2018    CO2 28 01/21/2018    BUN 22 01/21/2018    CREATININE 1 02 01/21/2018    CALCIUM 8 8 01/21/2018    AST 19 01/21/2018    ALT 23 01/21/2018    ALKPHOS 81 01/21/2018    EGFR 82 01/21/2018     No results found for: IRON, TIBC, FERRITIN  Lab Results   Component Value Date    LIPASE 92 01/21/2018       Recent blood work from September 16, 2019 reviewed  Normal lipid panel, CMP, CBC  Normal LFTs and hemoglobin is 16 6  Radiology Results:   No results found

## 2019-10-25 NOTE — PATIENT INSTRUCTIONS
Gastroesophageal Reflux Disease   WHAT YOU NEED TO KNOW:   Gastroesophageal reflux occurs when acid and food in the stomach back up into the esophagus  Gastroesophageal reflux disease (GERD) is reflux that occurs more than twice a week for a few weeks  It usually causes heartburn and other symptoms  GERD can cause other health problems over time if it is not treated  DISCHARGE INSTRUCTIONS:   Return to the emergency department if:   · You feel full and cannot burp or vomit  · You have severe chest pain and sudden trouble breathing  · Your bowel movements are black, bloody, or tarry-looking  · Your vomit looks like coffee grounds or has blood in it  Contact your healthcare provider if:   · You vomit large amounts, or you vomit often  · You have trouble breathing after you vomit  · You have trouble swallowing, or pain with swallowing  · You are losing weight without trying  · Your symptoms get worse or do not improve with treatment  · You have questions or concerns about your condition or care  Medicines:   · Medicines  are used to decrease stomach acid  Medicine may also be used to help your lower esophageal sphincter and stomach contract (tighten) more  · Take your medicine as directed  Contact your healthcare provider if you think your medicine is not helping or if you have side effects  Tell him of her if you are allergic to any medicine  Keep a list of the medicines, vitamins, and herbs you take  Include the amounts, and when and why you take them  Bring the list or the pill bottles to follow-up visits  Carry your medicine list with you in case of an emergency  Manage GERD:   · Do not have foods or drinks that may increase heartburn  These include chocolate, peppermint, fried or fatty foods, drinks that contain caffeine, or carbonated drinks (soda)  Other foods include spicy foods, onions, tomatoes, and tomato-based foods   Do not have foods or drinks that can irritate your esophagus, such as citrus fruits, juices, and alcohol  · Do not eat large meals  When you eat a lot of food at one time, your stomach needs more acid to digest it  Eat 6 small meals each day instead of 3 large ones, and eat slowly  Do not eat meals 2 to 3 hours before bedtime  · Elevate the head of your bed  Place 6-inch blocks under the head of your bed frame  You may also use more than one pillow under your head and shoulders while you sleep  · Maintain a healthy weight  If you are overweight, weight loss may help relieve symptoms of GERD  · Do not smoke  Smoking weakens the lower esophageal sphincter and increases the risk of GERD  Ask your healthcare provider for information if you currently smoke and need help to quit  E-cigarettes or smokeless tobacco still contain nicotine  Talk to your healthcare provider before you use these products  · Do not wear clothing that is tight around your waist   Tight clothing can put pressure on your stomach and cause or worsen GERD symptoms  Follow up with your healthcare provider as directed:  Write down your questions so you remember to ask them during your visits  © 2017 [facility] Information is for End User's use only and may not be sold, redistributed or otherwise used for commercial purposes  All illustrations and images included in CareNotes® are the copyrighted property of [employer]  or [employer]  The above information is an  only  It is not intended as medical advice for individual conditions or treatments  Talk to your doctor, nurse or pharmacist before following any medical regimen to see if it is safe and effective for you

## 2020-01-31 DIAGNOSIS — K21.00 GASTROESOPHAGEAL REFLUX DISEASE WITH ESOPHAGITIS: ICD-10-CM

## 2020-01-31 RX ORDER — PANTOPRAZOLE SODIUM 20 MG/1
20 TABLET, DELAYED RELEASE ORAL DAILY
Qty: 30 TABLET | Refills: 11 | Status: SHIPPED | OUTPATIENT
Start: 2020-01-31 | End: 2020-11-04 | Stop reason: SDUPTHER

## 2020-01-31 NOTE — TELEPHONE ENCOUNTER
Pt's caregiver Ross Ireland left  mssg stating he sees Dr Annmarie Elizabeth and is doing well; needs to be seen only yearly now/not ev 6 mos; will be due for appt in October but needs script for Pantoprazole 20/takes one daily; req to [place] for enough to get through Oct  If [place] # [phone]

## 2020-02-12 ENCOUNTER — TELEPHONE (OUTPATIENT)
Dept: GASTROENTEROLOGY | Facility: CLINIC | Age: 60
End: 2020-02-12

## 2020-02-12 NOTE — TELEPHONE ENCOUNTER
Appears was sent 1/31/20 #30 with 11 refills  Called Dulce Rubalcava back and she noted his last refill states 0 refills  Told her would call [facility] now and confirm  Called [facility], they do have the new script, they used up the old script first and that's why noted 0 refills

## 2020-02-12 NOTE — TELEPHONE ENCOUNTER
Fragmented call from (sounds like) Karina Self stating she called 1-2 wks ago about his meds; Dr Stone Lyons usually sees ev 6 mos but he is doing well/doesn't need to see him for a yr; has one last blister pack of med; needs Protonix 20 mg at 8 AM for mid March forward to [facility] [address]; asks for [facility] [phone]

## 2020-11-04 ENCOUNTER — OFFICE VISIT (OUTPATIENT)
Dept: GASTROENTEROLOGY | Facility: CLINIC | Age: 60
End: 2020-11-04
Payer: MEDICARE

## 2020-11-04 VITALS
BODY MASS INDEX: 23.4 KG/M2 | WEIGHT: 158 LBS | HEART RATE: 71 BPM | SYSTOLIC BLOOD PRESSURE: 118 MMHG | TEMPERATURE: 97.9 F | HEIGHT: 69 IN | DIASTOLIC BLOOD PRESSURE: 80 MMHG

## 2020-11-04 DIAGNOSIS — Z86.010 HISTORY OF COLON POLYPS: ICD-10-CM

## 2020-11-04 DIAGNOSIS — K21.00 GASTROESOPHAGEAL REFLUX DISEASE WITH ESOPHAGITIS: ICD-10-CM

## 2020-11-04 DIAGNOSIS — R63.4 WEIGHT LOSS, UNINTENTIONAL: Primary | ICD-10-CM

## 2020-11-04 PROCEDURE — 99214 OFFICE O/P EST MOD 30 MIN: CPT | Performed by: INTERNAL MEDICINE

## 2020-11-04 RX ORDER — PANTOPRAZOLE SODIUM 20 MG/1
20 TABLET, DELAYED RELEASE ORAL DAILY
Qty: 30 TABLET | Refills: 11 | Status: SHIPPED | OUTPATIENT
Start: 2020-11-04 | End: 2021-01-26 | Stop reason: SDUPTHER

## 2020-11-24 LAB
ALBUMIN SERPL-MCNC: 4.3 G/DL (ref 3.6–5.1)
ALBUMIN/GLOB SERPL: 1.8 (CALC) (ref 1–2.5)
ALP SERPL-CCNC: 65 U/L (ref 35–144)
ALT SERPL-CCNC: 14 U/L (ref 9–46)
AST SERPL-CCNC: 14 U/L (ref 10–35)
BASOPHILS # BLD AUTO: 30 CELLS/UL (ref 0–200)
BASOPHILS NFR BLD AUTO: 0.9 %
BILIRUB SERPL-MCNC: 1.2 MG/DL (ref 0.2–1.2)
BUN SERPL-MCNC: 25 MG/DL (ref 7–25)
BUN/CREAT SERPL: NORMAL (CALC) (ref 6–22)
CALCIUM SERPL-MCNC: 9.2 MG/DL (ref 8.6–10.3)
CHLORIDE SERPL-SCNC: 104 MMOL/L (ref 98–110)
CO2 SERPL-SCNC: 29 MMOL/L (ref 20–32)
CREAT SERPL-MCNC: 1.07 MG/DL (ref 0.7–1.33)
EOSINOPHIL # BLD AUTO: 30 CELLS/UL (ref 15–500)
EOSINOPHIL NFR BLD AUTO: 0.9 %
ERYTHROCYTE [DISTWIDTH] IN BLOOD BY AUTOMATED COUNT: 13 % (ref 11–15)
GLOBULIN SER CALC-MCNC: 2.4 G/DL (CALC) (ref 1.9–3.7)
GLUCOSE SERPL-MCNC: 94 MG/DL (ref 65–99)
HCT VFR BLD AUTO: 46.8 % (ref 38.5–50)
HGB BLD-MCNC: 16.1 G/DL (ref 13.2–17.1)
LYMPHOCYTES # BLD AUTO: 769 CELLS/UL (ref 850–3900)
LYMPHOCYTES NFR BLD AUTO: 23.3 %
MCH RBC QN AUTO: 30.9 PG (ref 27–33)
MCHC RBC AUTO-ENTMCNC: 34.4 G/DL (ref 32–36)
MCV RBC AUTO: 89.8 FL (ref 80–100)
MONOCYTES # BLD AUTO: 281 CELLS/UL (ref 200–950)
MONOCYTES NFR BLD AUTO: 8.5 %
NEUTROPHILS # BLD AUTO: 2191 CELLS/UL (ref 1500–7800)
NEUTROPHILS NFR BLD AUTO: 66.4 %
PLATELET # BLD AUTO: 128 THOUSAND/UL (ref 140–400)
PMV BLD REES-ECKER: 9.4 FL (ref 7.5–12.5)
POTASSIUM SERPL-SCNC: 4 MMOL/L (ref 3.5–5.3)
PROT SERPL-MCNC: 6.7 G/DL (ref 6.1–8.1)
RBC # BLD AUTO: 5.21 MILLION/UL (ref 4.2–5.8)
SL AMB EGFR AFRICAN AMERICAN: 88 ML/MIN/1.73M2
SL AMB EGFR NON AFRICAN AMERICAN: 76 ML/MIN/1.73M2
SODIUM SERPL-SCNC: 141 MMOL/L (ref 135–146)
TSH SERPL-ACNC: 1.21 MIU/L (ref 0.4–4.5)
WBC # BLD AUTO: 3.3 THOUSAND/UL (ref 3.8–10.8)

## 2020-12-07 ENCOUNTER — HOSPITAL ENCOUNTER (OUTPATIENT)
Dept: CT IMAGING | Facility: HOSPITAL | Age: 60
Discharge: HOME | End: 2020-12-07
Attending: INTERNAL MEDICINE
Payer: MEDICARE

## 2020-12-07 DIAGNOSIS — R63.4 WEIGHT LOSS, UNINTENTIONAL: ICD-10-CM

## 2020-12-07 PROCEDURE — 74177 CT ABD & PELVIS W/CONTRAST: CPT

## 2020-12-07 PROCEDURE — G1004 CDSM NDSC: HCPCS

## 2020-12-07 RX ADMIN — IOHEXOL 100 ML: 350 INJECTION, SOLUTION INTRAVENOUS at 14:32

## 2020-12-17 ENCOUNTER — TELEPHONE (OUTPATIENT)
Dept: GASTROENTEROLOGY | Facility: CLINIC | Age: 60
End: 2020-12-17

## 2021-01-26 ENCOUNTER — OFFICE VISIT (OUTPATIENT)
Dept: GASTROENTEROLOGY | Facility: CLINIC | Age: 61
End: 2021-01-26
Payer: MEDICARE

## 2021-01-26 VITALS
BODY MASS INDEX: 23.11 KG/M2 | DIASTOLIC BLOOD PRESSURE: 68 MMHG | HEIGHT: 69 IN | SYSTOLIC BLOOD PRESSURE: 116 MMHG | WEIGHT: 156 LBS | HEART RATE: 76 BPM

## 2021-01-26 DIAGNOSIS — R63.4 WEIGHT LOSS, UNINTENTIONAL: Primary | ICD-10-CM

## 2021-01-26 DIAGNOSIS — Z86.010 HISTORY OF COLON POLYPS: ICD-10-CM

## 2021-01-26 DIAGNOSIS — K21.00 GASTROESOPHAGEAL REFLUX DISEASE WITH ESOPHAGITIS: ICD-10-CM

## 2021-01-26 DIAGNOSIS — K21.00 GASTROESOPHAGEAL REFLUX DISEASE WITH ESOPHAGITIS WITHOUT HEMORRHAGE: ICD-10-CM

## 2021-01-26 PROCEDURE — 99214 OFFICE O/P EST MOD 30 MIN: CPT | Performed by: INTERNAL MEDICINE

## 2021-01-26 RX ORDER — PANTOPRAZOLE SODIUM 20 MG/1
20 TABLET, DELAYED RELEASE ORAL DAILY
Qty: 30 TABLET | Refills: 11 | Status: SHIPPED | OUTPATIENT
Start: 2021-01-26 | End: 2021-08-19 | Stop reason: SDUPTHER

## 2021-01-26 NOTE — PROGRESS NOTES
[address] [facility] - Outpatient Follow-up Note  Juan Miguel Duarte 62 y o  male MRN: [id number]  Encounter: [id number]    ASSESSMENT AND PLAN:      1  Weight loss, unintentional  50-year-old male here today with his , Юлия Boyd, for follow-up of his weight loss  His weight has overall stabilized in the past 3 months  However, he has still lost about 25 lb since January of 2020  CT was negative  Likely secondary to increased walking and healthier eating at the home  Patient is completely asymptomatic  Will complete the workup with an endoscopy and colonoscopy at this time  - Schedule [facility]GD and [facility] @ [facility]     - continue weight checks monthly    2  Gastroesophageal reflux disease with esophagitis without hemorrhage  Continue pantoprazole 20 mg daily  3  History of colon polyps  Due for colonoscopy April 2021  Followup Appointment:  6 months  ______________________________________________________________________    Chief Complaint   Patient presents with    Follow-up     CT results     HPI:  50-year-old male, intellectual disability, here today with his  for follow-up of his weight loss  Overall he has lost 25 lb since the beginning of last year  I would send the past 3 months, the weight has stabilized  However, unclear why the weight loss  There has been changes to healthier eating at the group home  Additionally, he states that he has been walking more  Nice any symptoms  No dysphagia  No heartburn  No abdominal pain, nausea vomiting, GI bleeding    Bowels are normal     Historical Information   Past Medical History:   Diagnosis Date    Colon polyp     GERD (gastroesophageal reflux disease)     Intellectual disability     Intractable hiccups     hospitalized for same    Mental retardation     Pneumonia      Past Surgical History:   Procedure Laterality Date    CHOLECYSTECTOMY      EGD AND COLONOSCOPY 04/04/2018    ESOPHAGOGASTRODUODENOSCOPY N/A 5/3/2017    Procedure: ESOPHAGOGASTRODUODENOSCOPY (EGD); Surgeon: Titus Hunt MD;  Location:  MAIN OR;  Service:    [facility] [place] ESOPHAGOGASTRODUODENOSCOPY TRANSORAL DIAGNOSTIC N/A 7/19/2017    Procedure: ESOPHAGOGASTRODUODENOSCOPY (EGD); Surgeon: Titus Hunt MD;  [facility];  Service: Gastroenterology    [place] ESOPHAGOGASTRODUODENOSCOPY TRANSORAL DIAGNOSTIC N/A 4/4/2018    Procedure: EGD AND COLONOSCOPY;  Surgeon: Titus Hunt MD;  [facility];  Service: Gastroenterology     Social History     Substance and Sexual Activity   Alcohol Use No     Social History     Substance and Sexual Activity   Drug Use No     Social History     Tobacco Use   Smoking Status Never Smoker   Smokeless Tobacco Never Used     Family History   Problem Relation Age of Onset    Cancer Father     Colon cancer Neg Hx     Colon polyps Neg Hx          Current Outpatient Medications:     docusate sodium (COLACE) 100 mg capsule    multivitamin-iron-minerals-folic acid (CENTRUM) chewable tablet    pantoprazole (PROTONIX) 20 mg tablet    tamsulosin (FLOMAX) 0 4 mg  No Known Allergies  Reviewed medications and allergies and updated as indicated    PHYSICAL EXAM:    Blood pressure 116/68, pulse 76, height 5' 9" (1 753 m), weight 70 8 kg (156 lb)  Body mass index is 23 04 kg/m²  General Appearance: NAD, cooperative, alert  Eyes: Anicteric, PERRLA, EOMI  ENT:  Normocephalic, atraumatic, normal mucosa  Neck:  Supple, symmetrical, trachea midline  Resp:  Clear to auscultation bilaterally; no rales, rhonchi or wheezing; respirations unlabored   CV:  S1 S2, Regular rate and rhythm; no murmur, rub, or gallop  GI:  Soft, non-tender, non-distended; normal bowel sounds; no masses, no organomegaly   Rectal: Deferred  Musculoskeletal: No cyanosis, clubbing or edema  Normal ROM    Skin:  No jaundice, rashes, or lesions   Heme/Lymph: No palpable cervical lymphadenopathy  Psych: Normal affect, good eye contact  Neuro: No gross deficits, AAOx3    Lab Results:   Lab Results   Component Value Date    WBC 3 3 (L) 11/23/2020    HGB 16 1 11/23/2020    HCT 46 8 11/23/2020    MCV 89 8 11/23/2020     (L) 11/23/2020     Lab Results   Component Value Date    K 4 0 11/23/2020     11/23/2020    CO2 29 11/23/2020    BUN 25 11/23/2020    CREATININE 1 07 11/23/2020    CALCIUM 9 2 11/23/2020    AST 14 11/23/2020    ALT 14 11/23/2020    ALKPHOS 65 11/23/2020    EGFR 82 01/21/2018     No results found for: IRON, TIBC, FERRITIN  Lab Results   Component Value Date    LIPASE 92 01/21/2018       Radiology Results:   No results found

## 2021-01-26 NOTE — LETTER
January 26, 2021     DO Nancy [address]  [address]    Patient: Marcus Terry   YOB: 1960   Date of Visit: 1/26/2021       Dear Dr Gonzalez Cheung: Thank you for referring Marcus Terry to me for evaluation  Below are my notes for this consultation  If you have questions, please do not hesitate to call me  I look forward to following your patient along with you  Sincerely,        Gail Nielsen MD        CC: No Recipients  Gail Nielsen MD  1/26/2021  1:19 PM  Sign when Signing Visit  [facility] - Outpatient Follow-up Note  Marcus Terry 62 y o  male MRN: [id number]  Encounter: [id number]    ASSESSMENT AND PLAN:      1  Weight loss, unintentional  60-year-old male here today with his , Smiley Morin, for follow-up of his weight loss  His weight has overall stabilized in the past 3 months  However, he has still lost about 25 lb since January of 2020  CT was negative  Likely secondary to increased walking and healthier eating at the home  Patient is completely asymptomatic  Will complete the workup with an endoscopy and colonoscopy at this time  - Schedule EGD and colonoscopy @ [facility]     - continue weight checks monthly    2  Gastroesophageal reflux disease with esophagitis without hemorrhage  Continue pantoprazole 20 mg daily  3  History of colon polyps  Due for colonoscopy April 2021  Followup Appointment:  6 months  ______________________________________________________________________    Chief Complaint   Patient presents with    Follow-up     CT results     HPI:  60-year-old male, intellectual disability, here today with his  for follow-up of his weight loss  Overall he has lost 25 lb since the beginning of last year  I would send the past 3 months, the weight has stabilized  However, unclear why the weight loss    There has been changes to healthier eating at the group home  Additionally, he states that he has been walking more  Nice any symptoms  No dysphagia  No heartburn  No abdominal pain, nausea vomiting, GI bleeding  Bowels are normal     Historical Information   Past Medical History:   Diagnosis Date    Colon polyp     GERD (gastroesophageal reflux disease)     Intellectual disability     Intractable hiccups     hospitalized for same    Mental retardation     Pneumonia      Past Surgical History:   Procedure Laterality Date    CHOLECYSTECTOMY      EGD AND COLONOSCOPY  04/04/2018    ESOPHAGOGASTRODUODENOSCOPY N/A 5/3/2017    Procedure: ESOPHAGOGASTRODUODENOSCOPY (EGD); Surgeon: Mima Huang MD;  Location:  MAIN OR;  Service:    [facility] [place] ESOPHAGOGASTRODUODENOSCOPY TRANSORAL DIAGNOSTIC N/A 7/19/2017    Procedure: ESOPHAGOGASTRODUODENOSCOPY (EGD); Surgeon: Mima Huang MD;  [facility];  Service: Gastroenterology    [place] ESOPHAGOGASTRODUODENOSCOPY TRANSORAL DIAGNOSTIC N/A 4/4/2018    Procedure: EGD AND COLONOSCOPY;  Surgeon: Mima Huang MD;  [facility];  Service: Gastroenterology     Social History     Substance and Sexual Activity   Alcohol Use No     Social History     Substance and Sexual Activity   Drug Use No     Social History     Tobacco Use   Smoking Status Never Smoker   Smokeless Tobacco Never Used     Family History   Problem Relation Age of Onset    Cancer Father     Colon cancer Neg Hx     Colon polyps Neg Hx          Current Outpatient Medications:     docusate sodium (COLACE) 100 mg capsule    multivitamin-iron-minerals-folic acid (CENTRUM) chewable tablet    pantoprazole (PROTONIX) 20 mg tablet    tamsulosin (FLOMAX) 0 4 mg  No Known Allergies  Reviewed medications and allergies and updated as indicated    PHYSICAL EXAM:    Blood pressure 116/68, pulse 76, height 5' 9" (1 753 m), weight 70 8 kg (156 lb)  Body mass index is 23 04 kg/m²    General Appearance: NAD, cooperative, alert  Eyes: Anicteric, PERRLA, EOMI  ENT:  Normocephalic, atraumatic, normal mucosa  Neck:  Supple, symmetrical, trachea midline  Resp:  Clear to auscultation bilaterally; no rales, rhonchi or wheezing; respirations unlabored   CV:  S1 S2, Regular rate and rhythm; no murmur, rub, or gallop  GI:  Soft, non-tender, non-distended; normal bowel sounds; no masses, no organomegaly   Rectal: Deferred  Musculoskeletal: No cyanosis, clubbing or edema  Normal ROM  Skin:  No jaundice, rashes, or lesions   Heme/Lymph: No palpable cervical lymphadenopathy  Psych: Normal affect, good eye contact  Neuro: No gross deficits, AAOx3    Lab Results:   Lab Results   Component Value Date    WBC 3 3 (L) 11/23/2020    HGB 16 1 11/23/2020    HCT 46 8 11/23/2020    MCV 89 8 11/23/2020     (L) 11/23/2020     Lab Results   Component Value Date    K 4 0 11/23/2020     11/23/2020    CO2 29 11/23/2020    BUN 25 11/23/2020    CREATININE 1 07 11/23/2020    CALCIUM 9 2 11/23/2020    AST 14 11/23/2020    ALT 14 11/23/2020    ALKPHOS 65 11/23/2020    EGFR 82 01/21/2018     No results found for: IRON, TIBC, FERRITIN  Lab Results   Component Value Date    LIPASE 92 01/21/2018       Radiology Results:   No results found

## 2021-05-04 VITALS — WEIGHT: 158.6 LBS | HEIGHT: 69 IN | BODY MASS INDEX: 23.49 KG/M2

## 2021-05-04 DIAGNOSIS — Z86.010 HISTORY OF COLON POLYPS: Primary | ICD-10-CM

## 2021-05-04 NOTE — TELEPHONE ENCOUNTER
Why does your doctor want you to have this procedure? Hx  Polyps/GERD    Do you have kidney disease?  no  If yes, are you on dialysis :     Have you had diverticulitis within the past 2 months? no    Are you diabetic?  no  If yes, insulin dependent: If yes, provide diabetic instructions sheet     Do take iron supplements?  no  If yes, instruct patient to hold iron supplement for 7 days prior    Are you on a blood thinner? no   Was the blood thinner sheet complete and faxed to cardiologist no  Plavix (clopidogrel), Coumadin (warfarin), Lovenox (enoxaparin), Xarelto (rivaroxaban), Pradaxa(dabigatran), Eliquis(apixaban) Savaysa/Lixiana (edoxapan)    Do you have an automatic implantable cardiac defibrillator (AICD)/pacemaker ([facility])? no  Was AICD/pacemaker sheet completed and faxed to cardiologist? no    Are you on home oxygen? no  If yes, continuous or nocturnal:     Have you been treated for MRSA, VRE or any communicable diseases? no    Heart attack, stroke, or stent within 3 months? no  Schedule at Hospital if within 3-6 months   Use nitroglycerin for chest pain in the last 6 months? no    History of organ  transplant?  no   If yes, notify Endo      History of neck/throat/tongue surgery or cancer? no  IF yes, notify Endo      Any problems with anesthesia in the past? no     Was stool C diff ordered?  no Stool specimen needs to be completed prior to procedure    Do have any facial or body piercings?no     Do you have a latex allergy? no     Do have an allergy to metals? (Bravo study only) no     If pediatric patient, was consent faxed to pediatrician no     Patient rights reviewed yes     Rx sent for Suprep to provider for signature  Instructions emailed to patient

## 2021-05-11 LAB — EXT SARS-COV-2: NEGATIVE

## 2021-08-16 ENCOUNTER — TELEPHONE (OUTPATIENT)
Dept: GASTROENTEROLOGY | Facility: CLINIC | Age: 61
End: 2021-08-16

## 2021-08-16 NOTE — TELEPHONE ENCOUNTER
Caregiver Ailyn Puente from [address] [facility] states last wk he had diarrhea Mon & Tues; they stopped Docusate sodium prescribed by PCP/he was better Wed until last night  Overnight nurse stated he had hiccups and diarrhea/just rec'd info that he has hiccupped for an hr straight/asks if he should be taken to ER? I asked if he is in pain? She states if he indicated he was in pain they would take him to ER/asks for CB w/ recommendation [phone]; adds he has appt the 19th

## 2021-08-16 NOTE — TELEPHONE ENCOUNTER
Called Scott Lunch back, last week (8/9 and 8/10) pt was having occasional diarrhea and hiccups, they changed his diet to bland, held his colace and the symptoms resolved  Last night he started with the hiccups again and was incontinent of stool  Today he started with hiccups constantly since 10 am  He lives at a group but is highly functioning and does not appear to be in any discomfort  There is no vomiting, fever or rectal bleeding  They resumed his bland diet and are holding the colace again  He takes Pantoprazole 20 mg daily  He has an apt scheduled 8/19  They will take him to the ER if he develops pain, vomiting or fever but questions advice until his ov  Could you please provide assistance as Dr LAZCANO [facility] is not in today    Thank you

## 2021-08-16 NOTE — TELEPHONE ENCOUNTER
Spoke with Marylin Rodriguez- currently no fevers, or pain- not sure if he still is having hiccups or diarrhea and she will check with group home as she is not there currently- they will call if any issues- otherwise will continue to hold Colace and cont bland diet and has appt 8/19- will discuss medication regimen at that time

## 2021-08-17 NOTE — TELEPHONE ENCOUNTER
Spoke to Emily Cedillo  Hiccupping frequently w/o breaks  Can tell his is uncomfortable but not in severe pain  Now is constantly hiccupping and burping  Did spit up last night  Not witnessed so uncertain if food? Still with diarrhea  Everything they try for hiccups only provides temporary relief  Pt has hx of hospitalization in past for hiccups and they feel like when they ask him about the hiccups, etc  He tries to answer so that he does not end up in the hospital again  No shortness of breath  On BRAT diet with water/gatorade  They wanted to check if there is anything else to do for him and/or if he can wait for appointment on Thursday?

## 2021-08-17 NOTE — TELEPHONE ENCOUNTER
Caregiver, Dave Harper, left [facility] stating following up to call yesterday; reports worsening hiccups w/ burping after; Pt reported spitting up in sink last night; sometimes grimacing; req # [phone] to advise if OK to wait for appt

## 2021-08-19 ENCOUNTER — OFFICE VISIT (OUTPATIENT)
Dept: GASTROENTEROLOGY | Facility: CLINIC | Age: 61
End: 2021-08-19
Payer: MEDICARE

## 2021-08-19 VITALS
SYSTOLIC BLOOD PRESSURE: 116 MMHG | DIASTOLIC BLOOD PRESSURE: 72 MMHG | BODY MASS INDEX: 23.55 KG/M2 | WEIGHT: 159 LBS | HEIGHT: 69 IN

## 2021-08-19 DIAGNOSIS — Z86.010 HISTORY OF COLON POLYPS: ICD-10-CM

## 2021-08-19 DIAGNOSIS — R06.6 HICCUPS: Primary | ICD-10-CM

## 2021-08-19 DIAGNOSIS — R63.4 WEIGHT LOSS, UNINTENTIONAL: ICD-10-CM

## 2021-08-19 DIAGNOSIS — K21.00 GASTROESOPHAGEAL REFLUX DISEASE WITH ESOPHAGITIS WITHOUT HEMORRHAGE: ICD-10-CM

## 2021-08-19 PROBLEM — D69.6 PLATELETS DECREASED (HCC): Status: ACTIVE | Noted: 2021-08-19

## 2021-08-19 PROCEDURE — 99214 OFFICE O/P EST MOD 30 MIN: CPT | Performed by: INTERNAL MEDICINE

## 2021-08-19 RX ORDER — ACETAMINOPHEN 325 MG/1
650 TABLET ORAL EVERY 6 HOURS PRN
COMMUNITY

## 2021-08-19 RX ORDER — PANTOPRAZOLE SODIUM 20 MG/1
20 TABLET, DELAYED RELEASE ORAL DAILY
Qty: 30 TABLET | Refills: 11 | Status: SHIPPED | OUTPATIENT
Start: 2021-08-19 | End: 2021-09-28

## 2021-08-19 NOTE — PROGRESS NOTES
[facility] - Outpatient Follow-up Note  Aurora Riddle 62 y o  male MRN: [id number]  Encounter: [id number]    ASSESSMENT AND PLAN:      1  Hiccups  60M here today for hiccups again  Previously evaluated and symptoms improved  Sounds like he eats rapidly and then does not want to go to workshop so goes in to his room since his hiccups are only on Sunday nights  Discussed w Rubin Number  At this point, he's fine so no need to alter his diet or add meds  She'll talk w his workshop coordinator to address the loud noises from the fan  She'll also address w the dining staff to assure he eats slowly  2  Gastroesophageal reflux disease with esophagitis without hemorrhage  Well controlled  EGD + for barretts  - RECALL EGD 5/2024  - pantoprazole (PROTONIX) 20 mg tablet; Take 1 tablet (20 mg total) by mouth daily  Dispense: 30 tablet; Refill: 11    3  Weight loss, unintentional  stable    4  History of colon polyps  Recall 5/2024      Followup Appointment: 6 months  ______________________________________________________________________    Chief Complaint   Patient presents with   [place]     HPI:  60M here today w Rubin Portillo () for hiccups  Two episodes now both on Sunday nights  Intractable hiccups which resolves within 24 hours  Has had prev episodes like this in past when I first evaluated the patient  He eats very quickly, and staff has trouble slowing him down  Denies carbonated beverages  Pt did express that he is completely fine now  No issues  Upon further investigation, sounds like he gets anxious thinking about going to workshop on Monday  Thus he has missed past two workshops from feeling unwell w the hiccups  When asked, pt states he doesn't like the loud noises at the workshop  Rubin Portillo will address this w the staff      Historical Information   Past Medical History:   Diagnosis Date    Colon polyp     GERD (gastroesophageal reflux disease)     Intellectual disability     Intractable hiccups     hospitalized for same    Mental retardation     Pneumonia      Past Surgical History:   Procedure Laterality Date    CHOLECYSTECTOMY      EGD AND COLONOSCOPY  04/04/2018    ESOPHAGOGASTRODUODENOSCOPY N/A 5/3/2017    Procedure: ESOPHAGOGASTRODUODENOSCOPY (EGD); Surgeon: Deon Shankar MD;  [facility];  Service:    [employer] [place] ESOPHAGOGASTRODUODENOSCOPY TRANSORAL DIAGNOSTIC N/A 7/19/2017    Procedure: ESOPHAGOGASTRODUODENOSCOPY (EGD); Surgeon: Deon Shankar MD;  [facility];  Service: Gastroenterology    [place] ESOPHAGOGASTRODUODENOSCOPY TRANSORAL DIAGNOSTIC N/A 4/4/2018    Procedure: EGD AND COLONOSCOPY;  Surgeon: Deon Shankar MD;  [facility];  Service: Gastroenterology     Social History     Substance and Sexual Activity   Alcohol Use No     Social History     Substance and Sexual Activity   Drug Use No     Social History     Tobacco Use   Smoking Status Never Smoker   Smokeless Tobacco Never Used     Family History   Problem Relation Age of Onset    Cancer Father     Colon cancer Neg Hx     Colon polyps Neg Hx          Current Outpatient Medications:     acetaminophen (TYLENOL) 325 mg tablet    multivitamin-iron-minerals-folic acid (CENTRUM) chewable tablet    pantoprazole (PROTONIX) 20 mg tablet    tamsulosin (FLOMAX) 0 4 mg  No Known Allergies  Reviewed medications and allergies and updated as indicated    PHYSICAL EXAM:    Blood pressure 116/72, height 5' 9" (1 753 m), weight 72 1 kg (159 lb)  Body mass index is 23 48 kg/m²  General Appearance: NAD, cooperative, alert  Eyes: Anicteric, PERRLA, EOMI  ENT:  Normocephalic, atraumatic, normal mucosa  Neck:  Supple, symmetrical, trachea midline  Resp:  Clear to auscultation bilaterally; no rales, rhonchi or wheezing; respirations unlabored   CV:  S1 S2, Regular rate and rhythm; no murmur, rub, or gallop    GI:  Soft, non-tender, non-distended; normal bowel sounds; no masses, no organomegaly Rectal: Deferred  Musculoskeletal: No cyanosis, clubbing or edema  Normal ROM  Skin:  No jaundice, rashes, or lesions   Heme/Lymph: No palpable cervical lymphadenopathy  Psych: Normal affect, good eye contact  Neuro: No gross deficits, AAOx3    Lab Results:   Lab Results   Component Value Date    WBC 3 3 (L) 11/23/2020    HGB 16 1 11/23/2020    HCT 46 8 11/23/2020    MCV 89 8 11/23/2020     (L) 11/23/2020     Lab Results   Component Value Date    K 4 0 11/23/2020     11/23/2020    CO2 29 11/23/2020    BUN 25 11/23/2020    CREATININE 1 07 11/23/2020    CALCIUM 9 2 11/23/2020    AST 14 11/23/2020    ALT 14 11/23/2020    ALKPHOS 65 11/23/2020    EGFR 82 01/21/2018     No results found for: IRON, TIBC, FERRITIN  Lab Results   Component Value Date    LIPASE 92 01/21/2018       Radiology Results:   No results found

## 2021-09-28 ENCOUNTER — TELEPHONE (OUTPATIENT)
Dept: GASTROENTEROLOGY | Facility: CLINIC | Age: 61
End: 2021-09-28

## 2021-09-28 DIAGNOSIS — K21.00 GASTROESOPHAGEAL REFLUX DISEASE WITH ESOPHAGITIS WITHOUT HEMORRHAGE: ICD-10-CM

## 2021-09-28 RX ORDER — PANTOPRAZOLE SODIUM 20 MG/1
TABLET, DELAYED RELEASE ORAL
Qty: 28 TABLET | Refills: 4 | Status: SHIPPED | OUTPATIENT
Start: 2021-09-28 | End: 2021-10-21

## 2021-09-28 NOTE — TELEPHONE ENCOUNTER
I returned call, no answer, left detailed message that [facility] did document on HAP consult form day of visit that Colace was to be discontinued and it was d/c'd in [address] [place] [address] Rd  Pantoprazole was also sent to [facility] pharmacy same day and noted receipt confirmed by pharmacy  GS did resend order today to [facility] and again it states receipt confirmed by pharmacy  I asked for call back if questions

## 2021-09-28 NOTE — TELEPHONE ENCOUNTER
Marlene Muñoz, from [facility] ([employer]) states she had Pt here on 8/19 to see GS  Med Docusate sodium was supposed to be DC'd and was not/Pt had to pay out of pocket  Also, Protonix was supposed to be refilled-[facility] pharm reports they sent many requests and nothing was rec'd  # [phone]

## 2021-10-20 ENCOUNTER — TELEPHONE (OUTPATIENT)
Dept: GASTROENTEROLOGY | Facility: CLINIC | Age: 61
End: 2021-10-20

## 2021-10-20 DIAGNOSIS — R06.6 HICCUPS: Primary | ICD-10-CM

## 2021-10-20 DIAGNOSIS — K21.00 GASTROESOPHAGEAL REFLUX DISEASE WITH ESOPHAGITIS WITHOUT HEMORRHAGE: ICD-10-CM

## 2021-10-21 RX ORDER — PANTOPRAZOLE SODIUM 40 MG/1
40 TABLET, DELAYED RELEASE ORAL DAILY
Qty: 30 TABLET | Refills: 11 | Status: SHIPPED | OUTPATIENT
Start: 2021-10-21

## 2021-10-21 RX ORDER — ALUMINUM HYDROXIDE AND MAGNESIUM CARBONATE 95; 358 MG/15ML; MG/15ML
15 LIQUID ORAL 4 TIMES DAILY PRN
Qty: 355 ML | Refills: 5 | Status: SHIPPED | OUTPATIENT
Start: 2021-10-21 | End: 2021-11-20

## 2022-08-24 DIAGNOSIS — K21.00 GASTROESOPHAGEAL REFLUX DISEASE WITH ESOPHAGITIS WITHOUT HEMORRHAGE: ICD-10-CM

## 2022-08-24 RX ORDER — PANTOPRAZOLE SODIUM 40 MG/1
TABLET, DELAYED RELEASE ORAL
Qty: 28 TABLET | Refills: 10 | Status: SHIPPED | OUTPATIENT
Start: 2022-08-24 | End: 2022-10-12 | Stop reason: SDUPTHER

## 2022-10-12 ENCOUNTER — OFFICE VISIT (OUTPATIENT)
Dept: GASTROENTEROLOGY | Facility: CLINIC | Age: 62
End: 2022-10-12
Payer: MEDICARE

## 2022-10-12 VITALS
WEIGHT: 161 LBS | SYSTOLIC BLOOD PRESSURE: 118 MMHG | DIASTOLIC BLOOD PRESSURE: 76 MMHG | HEIGHT: 66 IN | BODY MASS INDEX: 25.88 KG/M2

## 2022-10-12 DIAGNOSIS — R63.4 WEIGHT LOSS, UNINTENTIONAL: ICD-10-CM

## 2022-10-12 DIAGNOSIS — R06.6 HICCUPS: ICD-10-CM

## 2022-10-12 DIAGNOSIS — K21.00 GASTROESOPHAGEAL REFLUX DISEASE WITH ESOPHAGITIS WITHOUT HEMORRHAGE: Primary | ICD-10-CM

## 2022-10-12 DIAGNOSIS — Z86.010 HISTORY OF COLON POLYPS: ICD-10-CM

## 2022-10-12 PROCEDURE — 99214 OFFICE O/P EST MOD 30 MIN: CPT | Performed by: INTERNAL MEDICINE

## 2022-10-12 RX ORDER — PANTOPRAZOLE SODIUM 40 MG/1
40 TABLET, DELAYED RELEASE ORAL DAILY
Qty: 30 TABLET | Refills: 11 | Status: SHIPPED | OUTPATIENT
Start: 2022-10-12

## 2022-10-12 RX ORDER — ALUMINUM HYDROXIDE AND MAGNESIUM CARBONATE 95; 358 MG/15ML; MG/15ML
15 LIQUID ORAL 4 TIMES DAILY PRN
COMMUNITY
End: 2022-10-12 | Stop reason: SDUPTHER

## 2022-10-12 RX ORDER — ALUMINUM HYDROXIDE AND MAGNESIUM CARBONATE 95; 358 MG/15ML; MG/15ML
15 LIQUID ORAL 4 TIMES DAILY PRN
Qty: 177 ML | Refills: 2 | Status: SHIPPED | OUTPATIENT
Start: 2022-10-12

## 2022-10-12 NOTE — PROGRESS NOTES
[facility] - Outpatient Follow-up Note  Piyush Gaspar 62 y o  male MRN: [id number]  Encounter: [id number]    ASSESSMENT AND PLAN:      1  Gastroesophageal reflux disease with esophagitis without hemorrhage  26-year-old male here today with his caretaker for follow-up  Doing well  No issues  Previously will we were unable to drop the pantoprazole dose  Will continue the 40 mg for now  - GERD lifestyle modification  - pantoprazole (PROTONIX) 40 mg tablet; Take 1 tablet (40 mg total) by mouth daily  Dispense: 30 tablet; Refill: 11  - aluminum hydroxide-magnesium carbonate (Gaviscon)  mg/15 mL oral suspension; Take 15 mL by mouth 4 (four) times a day as needed (hiccups or heartburn)  Dispense: 177 mL; Refill: 2    2  Hiccups  Resolved    3  History of colon polyps  Recall May 2024 given 3 polyps on the last colonoscopy    4  Weight loss, unintentional  Weight has stabilized  Continue checking every month  EGD and colonoscopy done May of 2021 unrevealing  5  Thrombocytopenia  Noted on CBC from 07/07/2022  WBC 3 7 with platelets 467  Hemoglobin normal at 15 7  Recommend follow-up with PCP for possible repeat in referral to Hematology  Followup Appointment:  1 year  ______________________________________________________________________    Chief Complaint   Patient presents with   • Follow up-GERD     HPI:  26-year-old male here today for follow-up  Doing well  No issues today  Eating well  Weight is stable  Denies any nausea vomiting, hiccups, abdominal pain  No dysphagia  No abdominal pain or diarrhea  No bleeding      Historical Information   Past Medical History:   Diagnosis Date   • Colon polyp    • GERD (gastroesophageal reflux disease)    • Intellectual disability    • Intractable hiccups     hospitalized for same   • Mental retardation    • Pneumonia      Past Surgical History:   Procedure Laterality Date   • CHOLECYSTECTOMY     • EGD AND COLONOSCOPY  04/04/2018   • ESOPHAGOGASTRODUODENOSCOPY N/A 5/3/2017    Procedure: ESOPHAGOGASTRODUODENOSCOPY (EGD); Surgeon: Colton Coronado MD;  [facility];  Service:    • [place] ESOPHAGOGASTRODUODENOSCOPY TRANSORAL DIAGNOSTIC N/A 7/19/2017    Procedure: ESOPHAGOGASTRODUODENOSCOPY (EGD); Surgeon: Colton Coronado MD;  [facility];  Service: Gastroenterology   • [place] ESOPHAGOGASTRODUODENOSCOPY TRANSORAL DIAGNOSTIC N/A 4/4/2018    Procedure: EGD AND COLONOSCOPY;  Surgeon: Colton Coronado MD;  [facility];  Service: Gastroenterology     Social History     Substance and Sexual Activity   Alcohol Use No     Social History     Substance and Sexual Activity   Drug Use No     Social History     Tobacco Use   Smoking Status Never Smoker   Smokeless Tobacco Never Used     Family History   Problem Relation Age of Onset   • Cancer Father    • Colon cancer Neg Hx    • Colon polyps Neg Hx          Current Outpatient Medications:   •  acetaminophen (TYLENOL) 325 mg tablet  •  aluminum hydroxide-magnesium carbonate (Gaviscon)  mg/15 mL oral suspension  •  multivitamin-iron-minerals-folic acid (CENTRUM) chewable tablet  •  pantoprazole (PROTONIX) 40 mg tablet  •  tamsulosin (FLOMAX) 0 4 mg  No Known Allergies  Reviewed medications and allergies and updated as indicated    PHYSICAL EXAM:    Blood pressure 118/76, height 5' 6" (1 676 m), weight 73 kg (161 lb)  Body mass index is 25 99 kg/m²  General Appearance: NAD, cooperative, alert  Eyes: Anicteric, PERRLA, EOMI  ENT:  Normocephalic, atraumatic, normal mucosa  Neck:  Supple, symmetrical, trachea midline  Resp:  Clear to auscultation bilaterally; no rales, rhonchi or wheezing; respirations unlabored   CV:  S1 S2, Regular rate and rhythm; no murmur, rub, or gallop  GI:  Soft, non-tender, non-distended; normal bowel sounds; no masses, no organomegaly   Rectal: Deferred  Musculoskeletal: No cyanosis, clubbing or edema  Normal ROM    Skin:  No jaundice, rashes, or lesions   Heme/Lymph: No palpable cervical lymphadenopathy  Psych: Normal affect, good eye contact  Neuro: No gross deficits, AAOx3    Lab Results:   Labs from 07/07/2022 reviewed  Normal CMP  Mild thrombocytopenia noted on CBC  Recommend follow-up with PCP

## 2024-03-27 ENCOUNTER — TELEPHONE (OUTPATIENT)
Age: 64
End: 2024-03-27

## 2024-03-27 ENCOUNTER — PREP FOR PROCEDURE (OUTPATIENT)
Age: 64
End: 2024-03-27

## 2024-03-27 VITALS — HEIGHT: 69 IN | WEIGHT: 161 LBS | BODY MASS INDEX: 23.85 KG/M2

## 2024-03-27 DIAGNOSIS — Z86.010 HISTORY OF COLON POLYPS: Primary | ICD-10-CM

## 2024-03-27 NOTE — TELEPHONE ENCOUNTER
03/27/24  Screened by: Anna Modi    Referring Provider Tha    Pre- Screening:     There is no height or weight on file to calculate BMI.  Has patient been referred for a routine screening Colonoscopy? yes  Is the patient between 45-75 years old? yes      Previous Colonoscopy no   If yes:    Date:     Facility:     Reason:         Does the patient want to see a Gastroenterologist prior to their procedure OR are they having any GI symptoms? no    Has the patient been hospitalized or had abdominal surgery in the past 6 months? no    Does the patient use supplemental oxygen? no    Does the patient take Coumadin, Lovenox, Plavix, Elliquis, Xarelto, or other blood thinning medication? no    Has the patient had a stroke, cardiac event, or stent placed in the past year? no        If patient is between 45yrs - 49yrs, please advise patient that we will have to confirm benefits & coverage with their insurance company for a routine screening colonoscopy.

## 2024-03-27 NOTE — TELEPHONE ENCOUNTER
Scheduled date of colonoscopy (as of today):05/20/24  Physician performing colonoscopy: Tha  Location of colonoscopy:[facility]  Bowel prep reviewed with patient:GABE/SEBASTIAN emailed to [email or web address]  Instructions reviewed with patient by: NIKI  Clearances: N/A    : Lisa Loomis [phone]

## 2024-04-12 ENCOUNTER — TELEPHONE (OUTPATIENT)
Age: 64
End: 2024-04-12

## 2024-04-12 ENCOUNTER — OFFICE VISIT (OUTPATIENT)
Dept: GASTROENTEROLOGY | Facility: CLINIC | Age: 64
End: 2024-04-12
Payer: MEDICARE

## 2024-04-12 VITALS — WEIGHT: 188 LBS | SYSTOLIC BLOOD PRESSURE: 158 MMHG | DIASTOLIC BLOOD PRESSURE: 90 MMHG | BODY MASS INDEX: 27.76 KG/M2

## 2024-04-12 DIAGNOSIS — R06.6 HICCUPS: ICD-10-CM

## 2024-04-12 DIAGNOSIS — Z86.010 HISTORY OF COLON POLYPS: ICD-10-CM

## 2024-04-12 DIAGNOSIS — K21.00 GASTROESOPHAGEAL REFLUX DISEASE WITH ESOPHAGITIS WITHOUT HEMORRHAGE: Primary | ICD-10-CM

## 2024-04-12 PROCEDURE — 99214 OFFICE O/P EST MOD 30 MIN: CPT | Performed by: INTERNAL MEDICINE

## 2024-04-12 PROCEDURE — G2211 COMPLEX E/M VISIT ADD ON: HCPCS | Performed by: INTERNAL MEDICINE

## 2024-04-12 RX ORDER — PANTOPRAZOLE SODIUM 40 MG/1
40 TABLET, DELAYED RELEASE ORAL DAILY
Qty: 30 TABLET | Refills: 11 | Status: SHIPPED | OUTPATIENT
Start: 2024-04-12

## 2024-04-12 NOTE — PROGRESS NOTES
[facility] - Outpatient Follow-up Note  Nir Aaron 63 y.o. male MRN: [id number]  Encounter: [id number]    ASSESSMENT AND PLAN:      1. Gastroesophageal reflux disease with esophagitis without hemorrhage  63M here today for f/u with nurse from the center. No complaints. Doing well.     - Continue pantoprazole (PROTONIX) 40 mg tablet; Take 1 tablet (40 mg total) by mouth daily  Dispense: 30 tablet; Refill: 11    2. History of colon polyps  Due for colonoscopy 5/2024. Will schedule today.     - Schedule Colonoscopy @ [facility].  - I obtained informed consent from the patient and left VM for mom. The risks/benefits/alternatives of the procedure were discussed with the patient. Risks included, but not limited to, infection, bleeding, perforation, injury to organs in the abdomen, missed lesion and incomplete procedure were discussed. Patient was agreeable and electronic signature was obtained.      3. Hiccups  resolved      Followup Appointment: 1 year  ______________________________________________________________________    Chief Complaint   Patient presents with   • Follow-up     No issues     HPI:  63M here today for f/u. No complaints. Doing well.    Historical Information   Past Medical History:   Diagnosis Date   • Colon polyp    • GERD (gastroesophageal reflux disease)    • Intellectual disability    • Intractable hiccups     hospitalized for same   • Mental retardation    • Pneumonia      Past Surgical History:   Procedure Laterality Date   • CHOLECYSTECTOMY     • EGD AND COLONOSCOPY  04/04/2018   • ESOPHAGOGASTRODUODENOSCOPY N/A 5/3/2017    Procedure: ESOPHAGOGASTRODUODENOSCOPY (EGD);  Surgeon: Jacqueline Almazan MD;  Location:  MAIN OR;  Service:    • [place] ESOPHAGOGASTRODUODENOSCOPY TRANSORAL DIAGNOSTIC N/A 7/19/2017    Procedure: ESOPHAGOGASTRODUODENOSCOPY (EGD);  Surgeon: Jacqueline Almazan MD;  Location:  MAIN OR;  Service: Gastroenterology   • [place]  ESOPHAGOGASTRODUODENOSCOPY TRANSORAL DIAGNOSTIC N/A 4/4/2018    Procedure: EGD AND COLONOSCOPY;  Surgeon: Jacqueline Almazan MD;  Location:  MAIN OR;  Service: Gastroenterology     Social History     Substance and Sexual Activity   Alcohol Use No     Social History     Substance and Sexual Activity   Drug Use No     Social History     Tobacco Use   Smoking Status Never   Smokeless Tobacco Never     Family History   Problem Relation Age of Onset   • Cancer Father    • Colon cancer Neg Hx    • Colon polyps Neg Hx          Current Outpatient Medications:   •  acetaminophen (TYLENOL) 325 mg tablet  •  aluminum hydroxide-magnesium carbonate (Gaviscon)  mg/15 mL oral suspension  •  multivitamin-iron-minerals-folic acid (CENTRUM) chewable tablet  •  pantoprazole (PROTONIX) 40 mg tablet  •  tamsulosin (FLOMAX) 0.4 mg  No Known Allergies  Reviewed medications and allergies and updated as indicated    PHYSICAL EXAM:    Blood pressure 158/90, weight 85.3 kg (188 lb). Body mass index is 27.76 kg/m².  General Appearance: NAD, cooperative, alert  Eyes: Anicteric, conjunctiva pink  ENT:  Normocephalic, atraumatic, normal mucosa.    Neck:  Supple, symmetrical, trachea midline  Resp:  Clear to auscultation bilaterally; no rales, rhonchi or wheezing; respirations unlabored   CV:  S1 S2, Regular rate and rhythm; no murmur, rub, or gallop.  GI:  Soft, non-tender, non-distended; normal bowel sounds; no masses, no organomegaly   Rectal: Deferred  Musculoskeletal: No cyanosis, clubbing or edema. Normal ROM.  Skin:  No jaundice, rashes, or lesions   Heme/Lymph: No palpable cervical lymphadenopathy  Psych: Normal affect, good eye contact  Neuro: No gross deficits, AAOx3    Lab Results:   Lab Results   Component Value Date    WBC 3.3 (L) 11/23/2020    HGB 16.1 11/23/2020    HCT 46.8 11/23/2020    MCV 89.8 11/23/2020     (L) 11/23/2020     Lab Results   Component Value Date    K 4.0 11/23/2020     11/23/2020    CO2 29  11/23/2020    BUN 25 11/23/2020    CREATININE 1.07 11/23/2020    CALCIUM 9.2 11/23/2020    AST 14 11/23/2020    ALT 14 11/23/2020    ALKPHOS 65 11/23/2020    EGFR 82 01/21/2018       Radiology Results:   No results found.

## 2024-04-12 NOTE — TELEPHONE ENCOUNTER
Patients GI provider:  Dr. Almazan    Number to return call: [phone]    Reason for call: Pt's mom Narda returning Dr. Almazan's call stating Dr. Almazan left message stating is mom okay w/ pt having a colonoscopy. Per Narda it is okay to do colonoscopy but she also stated to make sure Dr. Almazan and her team are aware that pt had echo cardiogram and to make sure the results are read.    Scheduled procedure/appointment date if applicable: Procedure 5/20/24

## 2024-05-06 ENCOUNTER — ANESTHESIA EVENT (OUTPATIENT)
Dept: ANESTHESIOLOGY | Facility: AMBULATORY SURGERY CENTER | Age: 64
End: 2024-05-06

## 2024-05-06 ENCOUNTER — ANESTHESIA (OUTPATIENT)
Dept: ANESTHESIOLOGY | Facility: AMBULATORY SURGERY CENTER | Age: 64
End: 2024-05-06

## 2024-05-13 ENCOUNTER — TELEPHONE (OUTPATIENT)
Dept: GASTROENTEROLOGY | Facility: CLINIC | Age: 64
End: 2024-05-13

## 2024-05-13 NOTE — TELEPHONE ENCOUNTER
Procedure confirmed  Colonoscopy     Via: Spoke with patient.  Spoke with caregiver Lisa.    Instructions given: Email     Prep Given: Miralax/Dulcolax    Call the office if there are any questions.

## 2024-05-20 ENCOUNTER — ANESTHESIA EVENT (OUTPATIENT)
Dept: GASTROENTEROLOGY | Facility: AMBULATORY SURGERY CENTER | Age: 64
End: 2024-05-20

## 2024-05-20 ENCOUNTER — HOSPITAL ENCOUNTER (OUTPATIENT)
Dept: GASTROENTEROLOGY | Facility: AMBULATORY SURGERY CENTER | Age: 64
Discharge: HOME | End: 2024-05-20
Attending: INTERNAL MEDICINE
Payer: MEDICARE

## 2024-05-20 ENCOUNTER — ANESTHESIA (OUTPATIENT)
Dept: GASTROENTEROLOGY | Facility: AMBULATORY SURGERY CENTER | Age: 64
End: 2024-05-20

## 2024-05-20 VITALS
TEMPERATURE: 98.2 F | BODY MASS INDEX: 28.59 KG/M2 | OXYGEN SATURATION: 98 % | HEIGHT: 68 IN | DIASTOLIC BLOOD PRESSURE: 68 MMHG | RESPIRATION RATE: 24 BRPM | SYSTOLIC BLOOD PRESSURE: 107 MMHG | HEART RATE: 77 BPM

## 2024-05-20 DIAGNOSIS — Z86.010 HISTORY OF COLON POLYPS: ICD-10-CM

## 2024-05-20 PROCEDURE — 45385 COLONOSCOPY W/LESION REMOVAL: CPT | Performed by: INTERNAL MEDICINE

## 2024-05-20 PROCEDURE — 88305 TISSUE EXAM BY PATHOLOGIST: CPT | Performed by: PATHOLOGY

## 2024-05-20 RX ORDER — SODIUM CHLORIDE, SODIUM LACTATE, POTASSIUM CHLORIDE, CALCIUM CHLORIDE 600; 310; 30; 20 MG/100ML; MG/100ML; MG/100ML; MG/100ML
INJECTION, SOLUTION INTRAVENOUS CONTINUOUS PRN
Status: DISCONTINUED | OUTPATIENT
Start: 2024-05-20 | End: 2024-05-20

## 2024-05-20 RX ORDER — PROPOFOL 10 MG/ML
INJECTION, EMULSION INTRAVENOUS AS NEEDED
Status: DISCONTINUED | OUTPATIENT
Start: 2024-05-20 | End: 2024-05-20

## 2024-05-20 RX ORDER — SODIUM CHLORIDE, SODIUM LACTATE, POTASSIUM CHLORIDE, CALCIUM CHLORIDE 600; 310; 30; 20 MG/100ML; MG/100ML; MG/100ML; MG/100ML
50 INJECTION, SOLUTION INTRAVENOUS CONTINUOUS
Status: DISCONTINUED | OUTPATIENT
Start: 2024-05-20 | End: 2024-05-24 | Stop reason: HOSPADM

## 2024-05-20 RX ORDER — LIDOCAINE HYDROCHLORIDE 20 MG/ML
INJECTION, SOLUTION EPIDURAL; INFILTRATION; INTRACAUDAL; PERINEURAL AS NEEDED
Status: DISCONTINUED | OUTPATIENT
Start: 2024-05-20 | End: 2024-05-20

## 2024-05-20 RX ADMIN — PROPOFOL 40 MG: 10 INJECTION, EMULSION INTRAVENOUS at 13:04

## 2024-05-20 RX ADMIN — SODIUM CHLORIDE, SODIUM LACTATE, POTASSIUM CHLORIDE, CALCIUM CHLORIDE: 600; 310; 30; 20 INJECTION, SOLUTION INTRAVENOUS at 13:01

## 2024-05-20 RX ADMIN — PROPOFOL 20 MG: 10 INJECTION, EMULSION INTRAVENOUS at 13:10

## 2024-05-20 RX ADMIN — PROPOFOL 40 MG: 10 INJECTION, EMULSION INTRAVENOUS at 13:17

## 2024-05-20 RX ADMIN — PROPOFOL 50 MG: 10 INJECTION, EMULSION INTRAVENOUS at 13:13

## 2024-05-20 RX ADMIN — PROPOFOL 100 MG: 10 INJECTION, EMULSION INTRAVENOUS at 13:03

## 2024-05-20 RX ADMIN — PROPOFOL 40 MG: 10 INJECTION, EMULSION INTRAVENOUS at 13:08

## 2024-05-20 RX ADMIN — PROPOFOL 40 MG: 10 INJECTION, EMULSION INTRAVENOUS at 13:16

## 2024-05-20 RX ADMIN — SODIUM CHLORIDE, SODIUM LACTATE, POTASSIUM CHLORIDE, CALCIUM CHLORIDE 50 ML/HR: 600; 310; 30; 20 INJECTION, SOLUTION INTRAVENOUS at 12:43

## 2024-05-20 RX ADMIN — PROPOFOL 30 MG: 10 INJECTION, EMULSION INTRAVENOUS at 13:15

## 2024-05-20 RX ADMIN — LIDOCAINE HYDROCHLORIDE 100 MG: 20 INJECTION, SOLUTION EPIDURAL; INFILTRATION; INTRACAUDAL; PERINEURAL at 13:03

## 2024-05-20 NOTE — H&P
History and Physical - [facility]    Nir Aaron 63 y.o. male MRN: [id number]      HPI: Nir Aaron is a 63 y.o. male who presents for personal history of colon polyps.    No Known Allergies      REVIEW OF SYSTEMS: Per the HPI, and otherwise unremarkable.    Historical Information     Past Medical History:   Diagnosis Date    Colon polyp     GERD (gastroesophageal reflux disease)     Intellectual disability     Intractable hiccups     hospitalized for same    Mental retardation     Pneumonia      Past Surgical History:   Procedure Laterality Date    CHOLECYSTECTOMY      EGD AND COLONOSCOPY  04/04/2018    ESOPHAGOGASTRODUODENOSCOPY N/A 5/3/2017    Procedure: ESOPHAGOGASTRODUODENOSCOPY (EGD);  Surgeon: Jacqueline Almazan MD;  [facility];  Service:     [place] ESOPHAGOGASTRODUODENOSCOPY TRANSORAL DIAGNOSTIC N/A 7/19/2017    Procedure: ESOPHAGOGASTRODUODENOSCOPY (EGD);  Surgeon: Jacqueline Almazan MD;  [facility];  Service: Gastroenterology    [place] ESOPHAGOGASTRODUODENOSCOPY TRANSORAL DIAGNOSTIC N/A 4/4/2018    Procedure: EGD AND COLONOSCOPY;  Surgeon: Jacqueline Almazan MD;  [facility];  Service: Gastroenterology     Social History   Social History     Substance and Sexual Activity   Alcohol Use No     Social History     Substance and Sexual Activity   Drug Use No     Social History     Tobacco Use   Smoking Status Never   Smokeless Tobacco Never     Family History   Problem Relation Age of Onset    Cancer Father     Colon cancer Neg Hx     Colon polyps Neg Hx        Meds/Allergies       Current Outpatient Medications:     acetaminophen (TYLENOL) 325 mg tablet    aluminum hydroxide-magnesium carbonate (Gaviscon)  mg/15 mL oral suspension    multivitamin-iron-minerals-folic acid (CENTRUM) chewable tablet    pantoprazole (PROTONIX) 40 mg tablet    tamsulosin (FLOMAX) 0.4 mg    Current Facility-Administered Medications:     lactated ringers infusion, 50 mL/hr,  "Intravenous, Continuous, 50 mL/hr at 05/20/24 1243        Objective     /81   Pulse 81   Temp 98.2 °F (36.8 °C) (Temporal)   Resp 22   Ht 5' 8\" (1.727 m)   SpO2 98%   BMI 28.59 kg/m²       PHYSICAL EXAM    Gen: NAD AAOx3  Head: Normocephalic, Atraumatic  CV: S1S2 RRR no m/r/g  CHEST: Clear b/l no c/r/w  ABD: soft, +BS NT/ND no masses  EXT: no edema      ASSESSMENT/PLAN:  This is a 63 y.o. year old male here for colonoscopy, and he is stable and optimized for his procedure.        "

## 2024-05-20 NOTE — ANESTHESIA PREPROCEDURE EVALUATION
Procedure:  COLONOSCOPY    Relevant Problems   GI/HEPATIC   (+) Gastroesophageal reflux disease with esophagitis      Neurology/Sleep   (+) Intellectual disability      Other   (+) History of colon polyps   Pt unable to state his birthday, does not seem to know or understand that he is having a procedure done.            Anesthesia Plan  ASA Score- 2     Anesthesia Type- IV sedation with anesthesia with ASA Monitors.         Additional Monitors:     Airway Plan:            Plan Factors-Exercise tolerance (METS): >4 METS.    Chart reviewed.    Patient summary reviewed.                  Induction- intravenous.    Postoperative Plan-         Informed Consent- Anesthetic plan and risks discussed with mother (phone consent).  I personally reviewed this patient with the CRNA. Discussed and agreed on the Anesthesia Plan with the CRNA..

## 2024-05-20 NOTE — ANESTHESIA POSTPROCEDURE EVALUATION
Post-Op Assessment Note    CV Status:  Stable  Pain Score: 0    Pain management: adequate       Mental Status:  Sleepy   Hydration Status:  Stable   PONV Controlled:  None   Airway Patency:  Patent     Post Op Vitals Reviewed: Yes    No anethesia notable event occurred.    Staff: CRNA, Anesthesiologist               BP   109/63   Temp   36.2   Pulse  79   Resp   12   SpO2   96

## 2024-05-22 PROCEDURE — 88305 TISSUE EXAM BY PATHOLOGIST: CPT | Performed by: PATHOLOGY

## 2024-05-23 NOTE — RESULT ENCOUNTER NOTE
RECALL colon 7 years.    Nursing, please mail letter to pt's group home:    Dear Nir,    The polyp(s) removed from your colonoscopy are adenomas, which are the typical precancerous type of polyps we see during the colonoscopy.  Nothing to worry about at this point since the polyps are removed.  We will repeat the colonoscopy in 7 years as discussed. Please call with any further questions.      Best,    Dr. Almazan

## 2025-05-02 DIAGNOSIS — K21.00 GASTROESOPHAGEAL REFLUX DISEASE WITH ESOPHAGITIS WITHOUT HEMORRHAGE: ICD-10-CM

## 2025-05-05 RX ORDER — PANTOPRAZOLE SODIUM 40 MG/1
TABLET, DELAYED RELEASE ORAL
Qty: 30 TABLET | Refills: 5 | Status: SHIPPED | OUTPATIENT
Start: 2025-05-05

## (undated) DEVICE — SYRINGE 30ML LL

## (undated) DEVICE — 1200CC GUARDIAN II: Brand: GUARDIAN

## (undated) DEVICE — TUBING SUCTION 5MM X 12 FT

## (undated) DEVICE — LUBRICANT SURGILUBE TUBE 4 OZ  FLIP TOP

## (undated) DEVICE — SYRINGE 50ML LL

## (undated) DEVICE — REM POLYHESIVE ADULT PATIENT RETURN ELECTRODE: Brand: VALLEYLAB

## (undated) DEVICE — BITE BLOCK ADULT 11FR OMNI BLOC

## (undated) DEVICE — TRAP SPEC SUCT 4 CAPTURE CHMBR LF

## (undated) DEVICE — SINGLE-USE POLYPECTOMY SNARE: Brand: SENSATION SHORT THROW